# Patient Record
Sex: FEMALE | Race: WHITE | NOT HISPANIC OR LATINO | Employment: OTHER | URBAN - METROPOLITAN AREA
[De-identification: names, ages, dates, MRNs, and addresses within clinical notes are randomized per-mention and may not be internally consistent; named-entity substitution may affect disease eponyms.]

---

## 2018-07-06 ENCOUNTER — HISTORICAL (OUTPATIENT)
Dept: CARDIOLOGY | Facility: HOSPITAL | Age: 78
End: 2018-07-06

## 2018-07-16 ENCOUNTER — HISTORICAL (OUTPATIENT)
Dept: LAB | Facility: HOSPITAL | Age: 78
End: 2018-07-16

## 2018-07-16 LAB
ABS NEUT (OLG): 2.62 X10(3)/MCL (ref 2.1–9.2)
ALBUMIN SERPL-MCNC: 3.8 GM/DL (ref 3.4–5)
ALBUMIN/GLOB SERPL: 1.2 {RATIO}
ALP SERPL-CCNC: 61 UNIT/L (ref 38–126)
ALT SERPL-CCNC: 19 UNIT/L (ref 12–78)
AST SERPL-CCNC: 24 UNIT/L (ref 15–37)
BASOPHILS # BLD AUTO: 0 X10(3)/MCL (ref 0–0.2)
BASOPHILS NFR BLD AUTO: 0 %
BILIRUB SERPL-MCNC: 0.6 MG/DL (ref 0.2–1)
BILIRUBIN DIRECT+TOT PNL SERPL-MCNC: 0.1 MG/DL (ref 0–0.2)
BILIRUBIN DIRECT+TOT PNL SERPL-MCNC: 0.5 MG/DL (ref 0–0.8)
BUN SERPL-MCNC: 28 MG/DL (ref 7–18)
CALCIUM SERPL-MCNC: 9.1 MG/DL (ref 8.5–10.1)
CHLORIDE SERPL-SCNC: 105 MMOL/L (ref 98–107)
CHOLEST SERPL-MCNC: 256 MG/DL (ref 0–200)
CHOLEST/HDLC SERPL: 4.4 {RATIO} (ref 0–4)
CO2 SERPL-SCNC: 30 MMOL/L (ref 21–32)
CREAT SERPL-MCNC: 1.1 MG/DL (ref 0.55–1.02)
DEPRECATED CALCIDIOL+CALCIFEROL SERPL-MC: 36 NG/ML (ref 30–80)
EOSINOPHIL # BLD AUTO: 0.2 X10(3)/MCL (ref 0–0.9)
EOSINOPHIL NFR BLD AUTO: 4 %
ERYTHROCYTE [DISTWIDTH] IN BLOOD BY AUTOMATED COUNT: 14.4 % (ref 11.5–17)
GLOBULIN SER-MCNC: 3.3 GM/DL (ref 2.4–3.5)
GLUCOSE SERPL-MCNC: 99 MG/DL (ref 74–106)
HCT VFR BLD AUTO: 38.6 % (ref 37–47)
HDLC SERPL-MCNC: 58 MG/DL (ref 35–60)
HGB BLD-MCNC: 12 GM/DL (ref 12–16)
LDLC SERPL CALC-MCNC: 179 MG/DL (ref 0–129)
LYMPHOCYTES # BLD AUTO: 1.5 X10(3)/MCL (ref 0.6–4.6)
LYMPHOCYTES NFR BLD AUTO: 31 %
MAGNESIUM SERPL-MCNC: 2.2 MG/DL (ref 1.8–2.4)
MCH RBC QN AUTO: 27.3 PG (ref 27–31)
MCHC RBC AUTO-ENTMCNC: 31.1 GM/DL (ref 33–36)
MCV RBC AUTO: 87.9 FL (ref 80–94)
MONOCYTES # BLD AUTO: 0.5 X10(3)/MCL (ref 0.1–1.3)
MONOCYTES NFR BLD AUTO: 11 %
NEUTROPHILS # BLD AUTO: 2.62 X10(3)/MCL (ref 1.4–7.9)
NEUTROPHILS NFR BLD AUTO: 54 %
PHOSPHATE SERPL-MCNC: 4.1 MG/DL (ref 2.5–4.9)
PLATELET # BLD AUTO: 288 X10(3)/MCL (ref 130–400)
PMV BLD AUTO: 10.3 FL (ref 9.4–12.4)
POTASSIUM SERPL-SCNC: 4.6 MMOL/L (ref 3.5–5.1)
PROT SERPL-MCNC: 7.1 GM/DL (ref 6.4–8.2)
RBC # BLD AUTO: 4.39 X10(6)/MCL (ref 4.2–5.4)
SODIUM SERPL-SCNC: 141 MMOL/L (ref 136–145)
T4 SERPL-MCNC: 10.4 MCG/DL (ref 4.7–13.3)
TRIGL SERPL-MCNC: 96 MG/DL (ref 30–150)
TSH SERPL-ACNC: 1.64 MIU/L (ref 0.36–3.74)
VLDLC SERPL CALC-MCNC: 19 MG/DL
WBC # SPEC AUTO: 4.9 X10(3)/MCL (ref 4.5–11.5)

## 2018-09-04 ENCOUNTER — HISTORICAL (OUTPATIENT)
Dept: ADMINISTRATIVE | Facility: HOSPITAL | Age: 78
End: 2018-09-04

## 2018-10-16 ENCOUNTER — HISTORICAL (OUTPATIENT)
Dept: ADMINISTRATIVE | Facility: HOSPITAL | Age: 78
End: 2018-10-16

## 2019-01-23 ENCOUNTER — HISTORICAL (OUTPATIENT)
Dept: LAB | Facility: HOSPITAL | Age: 79
End: 2019-01-23

## 2019-01-23 LAB
ABS NEUT (OLG): 3.65 X10(3)/MCL (ref 2.1–9.2)
ALBUMIN SERPL-MCNC: 3.9 GM/DL (ref 3.4–5)
ALBUMIN/GLOB SERPL: 1.2 RATIO (ref 1.1–2)
ALP SERPL-CCNC: 60 UNIT/L (ref 38–126)
ALT SERPL-CCNC: 18 UNIT/L (ref 12–78)
AST SERPL-CCNC: 20 UNIT/L (ref 15–37)
BASOPHILS # BLD AUTO: 0 X10(3)/MCL (ref 0–0.2)
BASOPHILS NFR BLD AUTO: 0 %
BILIRUB SERPL-MCNC: 0.4 MG/DL (ref 0.2–1)
BILIRUBIN DIRECT+TOT PNL SERPL-MCNC: 0.1 MG/DL (ref 0–0.5)
BILIRUBIN DIRECT+TOT PNL SERPL-MCNC: 0.3 MG/DL (ref 0–0.8)
BUN SERPL-MCNC: 19 MG/DL (ref 7–18)
CALCIUM SERPL-MCNC: 9.4 MG/DL (ref 8.5–10.1)
CHLORIDE SERPL-SCNC: 105 MMOL/L (ref 98–107)
CO2 SERPL-SCNC: 32 MMOL/L (ref 21–32)
CREAT SERPL-MCNC: 1.02 MG/DL (ref 0.55–1.02)
DEPRECATED CALCIDIOL+CALCIFEROL SERPL-MC: 43.19 NG/ML (ref 30–80)
EOSINOPHIL # BLD AUTO: 0.1 X10(3)/MCL (ref 0–0.9)
EOSINOPHIL NFR BLD AUTO: 2 %
ERYTHROCYTE [DISTWIDTH] IN BLOOD BY AUTOMATED COUNT: 13.5 % (ref 11.5–17)
FERRITIN SERPL-MCNC: 7.9 NG/ML (ref 8–388)
FOLATE SERPL-MCNC: 19.7 NG/ML (ref 3.1–17.5)
GLOBULIN SER-MCNC: 3.2 GM/DL (ref 2.4–3.5)
GLUCOSE SERPL-MCNC: 97 MG/DL (ref 74–106)
HCT VFR BLD AUTO: 40.2 % (ref 37–47)
HGB BLD-MCNC: 12.2 GM/DL (ref 12–16)
IRON SATN MFR SERPL: 12.2 % (ref 20–50)
IRON SERPL-MCNC: 61 MCG/DL (ref 50–175)
LYMPHOCYTES # BLD AUTO: 1.3 X10(3)/MCL (ref 0.6–4.6)
LYMPHOCYTES NFR BLD AUTO: 24 %
MAGNESIUM SERPL-MCNC: 2.2 MG/DL (ref 1.8–2.4)
MCH RBC QN AUTO: 27.1 PG (ref 27–31)
MCHC RBC AUTO-ENTMCNC: 30.3 GM/DL (ref 33–36)
MCV RBC AUTO: 89.3 FL (ref 80–94)
MONOCYTES # BLD AUTO: 0.5 X10(3)/MCL (ref 0.1–1.3)
MONOCYTES NFR BLD AUTO: 10 %
NEUTROPHILS # BLD AUTO: 3.65 X10(3)/MCL (ref 2.1–9.2)
NEUTROPHILS NFR BLD AUTO: 64 %
PLATELET # BLD AUTO: 328 X10(3)/MCL (ref 130–400)
PMV BLD AUTO: 10 FL (ref 9.4–12.4)
POTASSIUM SERPL-SCNC: 4.6 MMOL/L (ref 3.5–5.1)
PROT SERPL-MCNC: 7.1 GM/DL (ref 6.4–8.2)
RBC # BLD AUTO: 4.5 X10(6)/MCL (ref 4.2–5.4)
SODIUM SERPL-SCNC: 141 MMOL/L (ref 136–145)
T4 FREE SERPL-MCNC: 0.94 NG/DL (ref 0.76–1.46)
TIBC SERPL-MCNC: 501 MCG/DL (ref 250–450)
TRANSFERRIN SERPL-MCNC: 370 MG/DL (ref 200–360)
TSH SERPL-ACNC: 2.62 MIU/L (ref 0.36–3.74)
VIT B12 SERPL-MCNC: 662 PG/ML (ref 193–986)
WBC # SPEC AUTO: 5.7 X10(3)/MCL (ref 4.5–11.5)

## 2020-02-10 ENCOUNTER — HISTORICAL (OUTPATIENT)
Dept: ADMINISTRATIVE | Facility: HOSPITAL | Age: 80
End: 2020-02-10

## 2020-02-10 LAB
ABS NEUT (OLG): 4.8 X10(3)/MCL (ref 2.1–9.2)
ALBUMIN SERPL-MCNC: 4 GM/DL (ref 3.4–5)
ALBUMIN/GLOB SERPL: 1.3 RATIO (ref 1.1–2)
ALP SERPL-CCNC: 55 UNIT/L (ref 38–126)
ALT SERPL-CCNC: 19 UNIT/L (ref 12–78)
AST SERPL-CCNC: 17 UNIT/L (ref 15–37)
BASOPHILS # BLD AUTO: 0 X10(3)/MCL (ref 0–0.2)
BASOPHILS NFR BLD AUTO: 0 %
BILIRUB SERPL-MCNC: 0.6 MG/DL (ref 0.2–1)
BILIRUBIN DIRECT+TOT PNL SERPL-MCNC: 0.2 MG/DL (ref 0–0.5)
BILIRUBIN DIRECT+TOT PNL SERPL-MCNC: 0.4 MG/DL (ref 0–0.8)
BUN SERPL-MCNC: 21 MG/DL (ref 7–18)
CALCIUM SERPL-MCNC: 9.4 MG/DL (ref 8.5–10.1)
CHLORIDE SERPL-SCNC: 105 MMOL/L (ref 98–107)
CO2 SERPL-SCNC: 30 MMOL/L (ref 21–32)
CREAT SERPL-MCNC: 1.09 MG/DL (ref 0.55–1.02)
EOSINOPHIL # BLD AUTO: 0.1 X10(3)/MCL (ref 0–0.9)
EOSINOPHIL NFR BLD AUTO: 2 %
ERYTHROCYTE [DISTWIDTH] IN BLOOD BY AUTOMATED COUNT: 12.2 % (ref 11.5–17)
GLOBULIN SER-MCNC: 3.1 GM/DL (ref 2.4–3.5)
GLUCOSE SERPL-MCNC: 102 MG/DL (ref 74–106)
HCT VFR BLD AUTO: 41.4 % (ref 37–47)
HGB BLD-MCNC: 13.2 GM/DL (ref 12–16)
LYMPHOCYTES # BLD AUTO: 1.4 X10(3)/MCL (ref 0.6–4.6)
LYMPHOCYTES NFR BLD AUTO: 20 %
MCH RBC QN AUTO: 29.5 PG (ref 27–31)
MCHC RBC AUTO-ENTMCNC: 31.9 GM/DL (ref 33–36)
MCV RBC AUTO: 92.4 FL (ref 80–94)
MONOCYTES # BLD AUTO: 0.5 X10(3)/MCL (ref 0.1–1.3)
MONOCYTES NFR BLD AUTO: 7 %
NEUTROPHILS # BLD AUTO: 4.8 X10(3)/MCL (ref 2.1–9.2)
NEUTROPHILS NFR BLD AUTO: 70 %
PLATELET # BLD AUTO: 281 X10(3)/MCL (ref 130–400)
PMV BLD AUTO: 9.4 FL (ref 9.4–12.4)
POTASSIUM SERPL-SCNC: 4.4 MMOL/L (ref 3.5–5.1)
PROT SERPL-MCNC: 7.1 GM/DL (ref 6.4–8.2)
RBC # BLD AUTO: 4.48 X10(6)/MCL (ref 4.2–5.4)
SODIUM SERPL-SCNC: 141 MMOL/L (ref 136–145)
WBC # SPEC AUTO: 6.9 X10(3)/MCL (ref 4.5–11.5)

## 2020-10-21 LAB
BUN SERPL-MCNC: 25 MG/DL (ref 7–18)
CHOLEST SERPL-MCNC: 205 MG/DL
CREAT SERPL-MCNC: 1.09 MG/DL (ref 0.6–1.3)
GLUCOSE SERPL-MCNC: 110 MG/DL (ref 74–106)
HDLC SERPL-MCNC: 59 MG/DL (ref 35–60)
LDLC SERPL CALC-MCNC: 128 MG/DL
TRIGL SERPL-MCNC: 100 MG/DL (ref 30–150)

## 2021-01-29 ENCOUNTER — TELEPHONE (OUTPATIENT)
Dept: UROLOGY | Facility: CLINIC | Age: 81
End: 2021-01-29

## 2021-03-04 ENCOUNTER — TELEPHONE (OUTPATIENT)
Dept: UROLOGY | Facility: CLINIC | Age: 81
End: 2021-03-04

## 2021-03-04 ENCOUNTER — PROCEDURE VISIT (OUTPATIENT)
Dept: UROLOGY | Facility: CLINIC | Age: 81
End: 2021-03-04
Payer: MEDICARE

## 2021-03-04 ENCOUNTER — OFFICE VISIT (OUTPATIENT)
Dept: UROLOGY | Facility: CLINIC | Age: 81
End: 2021-03-04
Payer: MEDICARE

## 2021-03-04 VITALS
TEMPERATURE: 97 F | RESPIRATION RATE: 18 BRPM | WEIGHT: 126.19 LBS | SYSTOLIC BLOOD PRESSURE: 140 MMHG | DIASTOLIC BLOOD PRESSURE: 76 MMHG | HEART RATE: 91 BPM | HEIGHT: 62 IN | BODY MASS INDEX: 23.22 KG/M2

## 2021-03-04 VITALS
SYSTOLIC BLOOD PRESSURE: 139 MMHG | HEART RATE: 80 BPM | HEIGHT: 62 IN | WEIGHT: 126.13 LBS | DIASTOLIC BLOOD PRESSURE: 89 MMHG | BODY MASS INDEX: 23.21 KG/M2

## 2021-03-04 DIAGNOSIS — R32 INCONTINENCE IN FEMALE: Primary | ICD-10-CM

## 2021-03-04 DIAGNOSIS — N39.41 URGENCY INCONTINENCE: ICD-10-CM

## 2021-03-04 PROCEDURE — 99205 PR OFFICE/OUTPT VISIT, NEW, LEVL V, 60-74 MIN: ICD-10-PCS | Mod: S$PBB,25,, | Performed by: UROLOGY

## 2021-03-04 PROCEDURE — 99999 PR PBB SHADOW E&M-EST. PATIENT-LVL III: ICD-10-PCS | Mod: PBBFAC,,, | Performed by: UROLOGY

## 2021-03-04 PROCEDURE — 52000 CYSTOURETHROSCOPY: CPT | Mod: S$PBB,,, | Performed by: UROLOGY

## 2021-03-04 PROCEDURE — 52000 PR CYSTOURETHROSCOPY: ICD-10-PCS | Mod: S$PBB,,, | Performed by: UROLOGY

## 2021-03-04 PROCEDURE — 52000 CYSTOURETHROSCOPY: CPT | Mod: PBBFAC | Performed by: UROLOGY

## 2021-03-04 PROCEDURE — 99205 OFFICE O/P NEW HI 60 MIN: CPT | Mod: S$PBB,25,, | Performed by: UROLOGY

## 2021-03-04 PROCEDURE — 99999 PR PBB SHADOW E&M-EST. PATIENT-LVL III: CPT | Mod: PBBFAC,,, | Performed by: UROLOGY

## 2021-03-04 PROCEDURE — 99213 OFFICE O/P EST LOW 20 MIN: CPT | Mod: PBBFAC,25 | Performed by: UROLOGY

## 2021-03-04 RX ORDER — LEVOTHYROXINE SODIUM 75 UG/1
TABLET ORAL
COMMUNITY
Start: 2020-10-28 | End: 2022-08-17 | Stop reason: SDUPTHER

## 2021-03-04 RX ORDER — OMEPRAZOLE 20 MG/1
20 CAPSULE, DELAYED RELEASE ORAL
COMMUNITY
Start: 2020-10-28 | End: 2022-08-17 | Stop reason: SDUPTHER

## 2021-03-04 RX ORDER — LEVOTHYROXINE SODIUM 50 UG/1
TABLET ORAL
COMMUNITY
Start: 2020-10-28 | End: 2022-09-12 | Stop reason: SDUPTHER

## 2021-03-04 RX ORDER — LIDOCAINE HYDROCHLORIDE 20 MG/ML
JELLY TOPICAL
Status: COMPLETED | OUTPATIENT
Start: 2021-03-04 | End: 2021-03-04

## 2021-03-04 RX ORDER — PRAVASTATIN SODIUM 20 MG/1
20 TABLET ORAL
COMMUNITY
Start: 2020-10-28 | End: 2022-08-17 | Stop reason: SDUPTHER

## 2021-03-04 RX ORDER — ASPIRIN 81 MG/1
81 TABLET ORAL DAILY
COMMUNITY

## 2021-03-04 RX ORDER — CHOLECALCIFEROL (VITAMIN D3) 25 MCG
1000 TABLET ORAL DAILY
COMMUNITY

## 2021-03-04 RX ADMIN — LIDOCAINE HYDROCHLORIDE: 20 JELLY TOPICAL at 12:03

## 2021-03-22 ENCOUNTER — TELEPHONE (OUTPATIENT)
Dept: UROLOGY | Facility: CLINIC | Age: 81
End: 2021-03-22

## 2021-04-01 ENCOUNTER — TELEPHONE (OUTPATIENT)
Dept: UROLOGY | Facility: CLINIC | Age: 81
End: 2021-04-01

## 2021-04-01 DIAGNOSIS — N32.0 BLADDER OUTLET OBSTRUCTION: Primary | ICD-10-CM

## 2021-04-22 ENCOUNTER — HISTORICAL (OUTPATIENT)
Dept: ADMINISTRATIVE | Facility: HOSPITAL | Age: 81
End: 2021-04-22

## 2021-04-22 LAB
ABS NEUT (OLG): 3.43 X10(3)/MCL (ref 2.1–9.2)
ALBUMIN SERPL-MCNC: 4.2 GM/DL (ref 3.4–4.8)
ALBUMIN/GLOB SERPL: 1.4 RATIO (ref 1.1–2)
ALP SERPL-CCNC: 61 UNIT/L (ref 40–150)
ALT SERPL-CCNC: 18 UNIT/L (ref 0–55)
AST SERPL-CCNC: 27 UNIT/L (ref 5–34)
BASOPHILS # BLD AUTO: 0 X10(3)/MCL (ref 0–0.2)
BASOPHILS NFR BLD AUTO: 1 %
BILIRUB SERPL-MCNC: 0.7 MG/DL
BILIRUBIN DIRECT+TOT PNL SERPL-MCNC: 0.2 MG/DL (ref 0–0.5)
BILIRUBIN DIRECT+TOT PNL SERPL-MCNC: 0.5 MG/DL (ref 0–0.8)
BUN SERPL-MCNC: 20.6 MG/DL (ref 9.8–20.1)
CALCIUM SERPL-MCNC: 9.6 MG/DL (ref 8.4–10.2)
CHLORIDE SERPL-SCNC: 104 MMOL/L (ref 98–107)
CHOLEST SERPL-MCNC: 213 MG/DL
CHOLEST/HDLC SERPL: 4 {RATIO} (ref 0–5)
CO2 SERPL-SCNC: 29 MMOL/L (ref 23–31)
CREAT SERPL-MCNC: 0.97 MG/DL (ref 0.55–1.02)
EOSINOPHIL # BLD AUTO: 0.1 X10(3)/MCL (ref 0–0.9)
EOSINOPHIL NFR BLD AUTO: 3 %
ERYTHROCYTE [DISTWIDTH] IN BLOOD BY AUTOMATED COUNT: 12.5 % (ref 11.5–17)
GLOBULIN SER-MCNC: 2.9 GM/DL (ref 2.4–3.5)
GLUCOSE SERPL-MCNC: 79 MG/DL (ref 82–115)
HCT VFR BLD AUTO: 41.8 % (ref 37–47)
HDLC SERPL-MCNC: 59 MG/DL (ref 35–60)
HGB BLD-MCNC: 13.4 GM/DL (ref 12–16)
LDLC SERPL CALC-MCNC: 136 MG/DL (ref 50–140)
LYMPHOCYTES # BLD AUTO: 1.3 X10(3)/MCL (ref 0.6–4.6)
LYMPHOCYTES NFR BLD AUTO: 24 %
MCH RBC QN AUTO: 29.8 PG (ref 27–31)
MCHC RBC AUTO-ENTMCNC: 32.1 GM/DL (ref 33–36)
MCV RBC AUTO: 92.9 FL (ref 80–94)
MONOCYTES # BLD AUTO: 0.5 X10(3)/MCL (ref 0.1–1.3)
MONOCYTES NFR BLD AUTO: 9 %
NEUTROPHILS # BLD AUTO: 3.43 X10(3)/MCL (ref 2.1–9.2)
NEUTROPHILS NFR BLD AUTO: 63 %
PLATELET # BLD AUTO: 302 X10(3)/MCL (ref 130–400)
PMV BLD AUTO: 10.5 FL (ref 9.4–12.4)
POTASSIUM SERPL-SCNC: 4.6 MMOL/L (ref 3.5–5.1)
PROT SERPL-MCNC: 7.1 GM/DL (ref 5.8–7.6)
RBC # BLD AUTO: 4.5 X10(6)/MCL (ref 4.2–5.4)
SODIUM SERPL-SCNC: 140 MMOL/L (ref 136–145)
T3FREE SERPL-MCNC: 2.43 PG/ML (ref 1.58–3.91)
T4 FREE SERPL-MCNC: 1.02 NG/DL (ref 0.7–1.48)
TRIGL SERPL-MCNC: 88 MG/DL (ref 37–140)
TSH SERPL-ACNC: 3.97 UIU/ML (ref 0.35–4.94)
VLDLC SERPL CALC-MCNC: 18 MG/DL
WBC # SPEC AUTO: 5.4 X10(3)/MCL (ref 4.5–11.5)

## 2021-05-03 ENCOUNTER — TELEPHONE (OUTPATIENT)
Dept: UROLOGY | Facility: CLINIC | Age: 81
End: 2021-05-03

## 2021-05-03 RX ORDER — CEFAZOLIN SODIUM 1 G/3ML
2 INJECTION, POWDER, FOR SOLUTION INTRAMUSCULAR; INTRAVENOUS
Status: CANCELLED | OUTPATIENT
Start: 2021-05-03

## 2021-05-04 ENCOUNTER — ANESTHESIA EVENT (OUTPATIENT)
Dept: SURGERY | Facility: HOSPITAL | Age: 81
End: 2021-05-04
Payer: MEDICARE

## 2021-05-04 ENCOUNTER — ANESTHESIA (OUTPATIENT)
Dept: SURGERY | Facility: HOSPITAL | Age: 81
End: 2021-05-04
Payer: MEDICARE

## 2021-05-04 ENCOUNTER — HOSPITAL ENCOUNTER (OUTPATIENT)
Facility: HOSPITAL | Age: 81
Discharge: HOME | End: 2021-05-04
Attending: UROLOGY | Admitting: UROLOGY
Payer: MEDICARE

## 2021-05-04 VITALS
DIASTOLIC BLOOD PRESSURE: 81 MMHG | HEART RATE: 75 BPM | BODY MASS INDEX: 23.79 KG/M2 | WEIGHT: 126 LBS | OXYGEN SATURATION: 98 % | RESPIRATION RATE: 18 BRPM | TEMPERATURE: 98 F | HEIGHT: 61 IN | SYSTOLIC BLOOD PRESSURE: 148 MMHG

## 2021-05-04 DIAGNOSIS — N39.41 URGENCY INCONTINENCE: ICD-10-CM

## 2021-05-04 PROCEDURE — 36000706: Performed by: UROLOGY

## 2021-05-04 PROCEDURE — D9220A PRA ANESTHESIA: Mod: ANES,,, | Performed by: ANESTHESIOLOGY

## 2021-05-04 PROCEDURE — 36000707: Performed by: UROLOGY

## 2021-05-04 PROCEDURE — 37000009 HC ANESTHESIA EA ADD 15 MINS: Performed by: UROLOGY

## 2021-05-04 PROCEDURE — 57000 COLPOTOMY W/EXPLORATION: CPT | Mod: ,,, | Performed by: UROLOGY

## 2021-05-04 PROCEDURE — 57000: ICD-10-PCS | Mod: ,,, | Performed by: UROLOGY

## 2021-05-04 PROCEDURE — 25000003 PHARM REV CODE 250: Performed by: NURSE ANESTHETIST, CERTIFIED REGISTERED

## 2021-05-04 PROCEDURE — 37000008 HC ANESTHESIA 1ST 15 MINUTES: Performed by: UROLOGY

## 2021-05-04 PROCEDURE — 71000044 HC DOSC ROUTINE RECOVERY FIRST HOUR: Performed by: UROLOGY

## 2021-05-04 PROCEDURE — 71000015 HC POSTOP RECOV 1ST HR: Performed by: UROLOGY

## 2021-05-04 PROCEDURE — 25000003 PHARM REV CODE 250: Performed by: UROLOGY

## 2021-05-04 PROCEDURE — 27201423 OPTIME MED/SURG SUP & DEVICES STERILE SUPPLY: Performed by: UROLOGY

## 2021-05-04 PROCEDURE — D9220A PRA ANESTHESIA: Mod: CRNA,,, | Performed by: NURSE ANESTHETIST, CERTIFIED REGISTERED

## 2021-05-04 PROCEDURE — 63600175 PHARM REV CODE 636 W HCPCS: Performed by: NURSE ANESTHETIST, CERTIFIED REGISTERED

## 2021-05-04 PROCEDURE — D9220A PRA ANESTHESIA: ICD-10-PCS | Mod: CRNA,,, | Performed by: NURSE ANESTHETIST, CERTIFIED REGISTERED

## 2021-05-04 PROCEDURE — D9220A PRA ANESTHESIA: ICD-10-PCS | Mod: ANES,,, | Performed by: ANESTHESIOLOGY

## 2021-05-04 RX ORDER — ONDANSETRON 2 MG/ML
INJECTION INTRAMUSCULAR; INTRAVENOUS
Status: DISCONTINUED | OUTPATIENT
Start: 2021-05-04 | End: 2021-05-04

## 2021-05-04 RX ORDER — SODIUM CHLORIDE 0.9 % (FLUSH) 0.9 %
3 SYRINGE (ML) INJECTION
Status: DISCONTINUED | OUTPATIENT
Start: 2021-05-04 | End: 2021-05-04 | Stop reason: HOSPADM

## 2021-05-04 RX ORDER — PROPOFOL 10 MG/ML
VIAL (ML) INTRAVENOUS
Status: DISCONTINUED | OUTPATIENT
Start: 2021-05-04 | End: 2021-05-04

## 2021-05-04 RX ORDER — FENTANYL CITRATE 50 UG/ML
INJECTION, SOLUTION INTRAMUSCULAR; INTRAVENOUS
Status: DISCONTINUED | OUTPATIENT
Start: 2021-05-04 | End: 2021-05-04

## 2021-05-04 RX ORDER — EPHEDRINE SULFATE 50 MG/ML
INJECTION, SOLUTION INTRAVENOUS
Status: DISCONTINUED | OUTPATIENT
Start: 2021-05-04 | End: 2021-05-04

## 2021-05-04 RX ORDER — DEXAMETHASONE SODIUM PHOSPHATE 4 MG/ML
INJECTION, SOLUTION INTRA-ARTICULAR; INTRALESIONAL; INTRAMUSCULAR; INTRAVENOUS; SOFT TISSUE
Status: DISCONTINUED | OUTPATIENT
Start: 2021-05-04 | End: 2021-05-04

## 2021-05-04 RX ORDER — OXYCODONE AND ACETAMINOPHEN 5; 325 MG/1; MG/1
1 TABLET ORAL EVERY 4 HOURS PRN
Qty: 5 TABLET | Refills: 0 | Status: SHIPPED | OUTPATIENT
Start: 2021-05-04 | End: 2021-05-31

## 2021-05-04 RX ORDER — LIDOCAINE HYDROCHLORIDE 20 MG/ML
INJECTION INTRAVENOUS
Status: DISCONTINUED | OUTPATIENT
Start: 2021-05-04 | End: 2021-05-04

## 2021-05-04 RX ORDER — HYDROCODONE BITARTRATE AND ACETAMINOPHEN 5; 325 MG/1; MG/1
1 TABLET ORAL EVERY 4 HOURS PRN
Status: DISCONTINUED | OUTPATIENT
Start: 2021-05-04 | End: 2021-05-04 | Stop reason: HOSPADM

## 2021-05-04 RX ORDER — METOPROLOL TARTRATE 1 MG/ML
INJECTION, SOLUTION INTRAVENOUS
Status: DISCONTINUED | OUTPATIENT
Start: 2021-05-04 | End: 2021-05-04

## 2021-05-04 RX ORDER — CEFAZOLIN SODIUM 1 G/3ML
INJECTION, POWDER, FOR SOLUTION INTRAMUSCULAR; INTRAVENOUS
Status: DISCONTINUED | OUTPATIENT
Start: 2021-05-04 | End: 2021-05-04

## 2021-05-04 RX ORDER — BUPIVACAINE HYDROCHLORIDE AND EPINEPHRINE 5; 5 MG/ML; UG/ML
INJECTION, SOLUTION EPIDURAL; INTRACAUDAL; PERINEURAL
Status: DISCONTINUED | OUTPATIENT
Start: 2021-05-04 | End: 2021-05-04 | Stop reason: HOSPADM

## 2021-05-04 RX ORDER — PHENYLEPHRINE HCL IN 0.9% NACL 1 MG/10 ML
SYRINGE (ML) INTRAVENOUS
Status: DISCONTINUED | OUTPATIENT
Start: 2021-05-04 | End: 2021-05-04

## 2021-05-04 RX ADMIN — GLYCOPYRROLATE 0.2 MCG: 0.2 INJECTION, SOLUTION INTRAMUSCULAR; INTRAVITREAL at 12:05

## 2021-05-04 RX ADMIN — CEFAZOLIN 2 G: 330 INJECTION, POWDER, FOR SOLUTION INTRAMUSCULAR; INTRAVENOUS at 11:05

## 2021-05-04 RX ADMIN — ONDANSETRON 4 MG: 2 INJECTION, SOLUTION INTRAMUSCULAR; INTRAVENOUS at 01:05

## 2021-05-04 RX ADMIN — LIDOCAINE HYDROCHLORIDE 50 MG: 20 INJECTION, SOLUTION INTRAVENOUS at 11:05

## 2021-05-04 RX ADMIN — Medication 100 MCG: at 12:05

## 2021-05-04 RX ADMIN — FENTANYL CITRATE 50 MCG: 50 INJECTION, SOLUTION INTRAMUSCULAR; INTRAVENOUS at 01:05

## 2021-05-04 RX ADMIN — Medication 200 MCG: at 12:05

## 2021-05-04 RX ADMIN — PROPOFOL 100 MG: 10 INJECTION, EMULSION INTRAVENOUS at 11:05

## 2021-05-04 RX ADMIN — EPHEDRINE SULFATE 10 MG: 50 INJECTION INTRAVENOUS at 12:05

## 2021-05-04 RX ADMIN — FENTANYL CITRATE 25 MCG: 50 INJECTION, SOLUTION INTRAMUSCULAR; INTRAVENOUS at 11:05

## 2021-05-04 RX ADMIN — DEXAMETHASONE SODIUM PHOSPHATE 4 MG: 4 INJECTION, SOLUTION INTRAMUSCULAR; INTRAVENOUS at 01:05

## 2021-05-04 RX ADMIN — FENTANYL CITRATE 25 MCG: 50 INJECTION, SOLUTION INTRAMUSCULAR; INTRAVENOUS at 12:05

## 2021-05-04 RX ADMIN — SODIUM CHLORIDE: 0.9 INJECTION, SOLUTION INTRAVENOUS at 11:05

## 2021-05-04 RX ADMIN — METOPROLOL TARTRATE 2.5 MG: 5 INJECTION, SOLUTION INTRAVENOUS at 01:05

## 2021-05-04 RX ADMIN — LIDOCAINE HYDROCHLORIDE 50 MG: 20 INJECTION, SOLUTION INTRAVENOUS at 01:05

## 2021-05-25 ENCOUNTER — TELEPHONE (OUTPATIENT)
Dept: UROLOGY | Facility: CLINIC | Age: 81
End: 2021-05-25

## 2021-05-31 ENCOUNTER — OFFICE VISIT (OUTPATIENT)
Dept: UROLOGY | Facility: CLINIC | Age: 81
End: 2021-05-31
Payer: MEDICARE

## 2021-05-31 VITALS
BODY MASS INDEX: 23.85 KG/M2 | SYSTOLIC BLOOD PRESSURE: 147 MMHG | DIASTOLIC BLOOD PRESSURE: 90 MMHG | WEIGHT: 126.31 LBS | HEART RATE: 80 BPM | HEIGHT: 61 IN

## 2021-05-31 DIAGNOSIS — N39.46 MIXED INCONTINENCE: Primary | ICD-10-CM

## 2021-05-31 PROCEDURE — 99999 PR PBB SHADOW E&M-EST. PATIENT-LVL III: CPT | Mod: PBBFAC,,, | Performed by: UROLOGY

## 2021-05-31 PROCEDURE — 99999 PR PBB SHADOW E&M-EST. PATIENT-LVL III: ICD-10-PCS | Mod: PBBFAC,,, | Performed by: UROLOGY

## 2021-05-31 PROCEDURE — 99214 PR OFFICE/OUTPT VISIT, EST, LEVL IV, 30-39 MIN: ICD-10-PCS | Mod: S$PBB,,, | Performed by: UROLOGY

## 2021-05-31 PROCEDURE — 99214 OFFICE O/P EST MOD 30 MIN: CPT | Mod: S$PBB,,, | Performed by: UROLOGY

## 2021-05-31 PROCEDURE — 99213 OFFICE O/P EST LOW 20 MIN: CPT | Mod: PBBFAC | Performed by: UROLOGY

## 2021-05-31 RX ORDER — SOLIFENACIN SUCCINATE 10 MG/1
10 TABLET, FILM COATED ORAL DAILY
Qty: 30 TABLET | Refills: 11 | Status: SHIPPED | OUTPATIENT
Start: 2021-05-31 | End: 2022-05-31

## 2021-05-31 RX ORDER — ESCITALOPRAM OXALATE 10 MG/1
5 TABLET ORAL DAILY
COMMUNITY
Start: 2021-05-17 | End: 2022-08-17 | Stop reason: SDUPTHER

## 2021-06-30 ENCOUNTER — HISTORICAL (OUTPATIENT)
Dept: ADMINISTRATIVE | Facility: HOSPITAL | Age: 81
End: 2021-06-30

## 2021-06-30 LAB
ABS NEUT (OLG): 4.55 X10(3)/MCL (ref 2.1–9.2)
ALBUMIN SERPL-MCNC: 3.9 GM/DL (ref 3.4–4.8)
ALBUMIN/GLOB SERPL: 1.3 RATIO (ref 1.1–2)
ALP SERPL-CCNC: 59 UNIT/L (ref 40–150)
ALT SERPL-CCNC: 11 UNIT/L (ref 0–55)
APPEARANCE, UA: CLEAR
AST SERPL-CCNC: 19 UNIT/L (ref 5–34)
BACTERIA SPEC CULT: NORMAL /HPF
BASOPHILS # BLD AUTO: 0 X10(3)/MCL (ref 0–0.2)
BASOPHILS NFR BLD AUTO: 0 %
BILIRUB SERPL-MCNC: 0.7 MG/DL
BILIRUB UR QL STRIP: NEGATIVE
BILIRUBIN DIRECT+TOT PNL SERPL-MCNC: 0.2 MG/DL (ref 0–0.5)
BILIRUBIN DIRECT+TOT PNL SERPL-MCNC: 0.5 MG/DL (ref 0–0.8)
BUN SERPL-MCNC: 17 MG/DL (ref 9.8–20.1)
CALCIUM SERPL-MCNC: 9.6 MG/DL (ref 8.4–10.2)
CHLORIDE SERPL-SCNC: 100 MMOL/L (ref 98–107)
CO2 SERPL-SCNC: 29 MMOL/L (ref 23–31)
COLOR UR: YELLOW
CREAT SERPL-MCNC: 1.18 MG/DL (ref 0.55–1.02)
DEPRECATED CALCIDIOL+CALCIFEROL SERPL-MC: 46.3 NG/ML (ref 30–80)
EOSINOPHIL # BLD AUTO: 0.1 X10(3)/MCL (ref 0–0.9)
EOSINOPHIL NFR BLD AUTO: 1 %
ERYTHROCYTE [DISTWIDTH] IN BLOOD BY AUTOMATED COUNT: 12.3 % (ref 11.5–17)
FERRITIN SERPL-MCNC: 33.92 NG/ML (ref 4.63–204)
GLOBULIN SER-MCNC: 3.1 GM/DL (ref 2.4–3.5)
GLUCOSE (UA): NEGATIVE
GLUCOSE SERPL-MCNC: 106 MG/DL (ref 82–115)
HCT VFR BLD AUTO: 40.4 % (ref 37–47)
HGB BLD-MCNC: 12.8 GM/DL (ref 12–16)
HGB UR QL STRIP: NEGATIVE
IRON SATN MFR SERPL: 30 % (ref 20–50)
IRON SERPL-MCNC: 95 UG/DL (ref 50–170)
KETONES UR QL STRIP: NEGATIVE
LEUKOCYTE ESTERASE UR QL STRIP: NEGATIVE
LYMPHOCYTES # BLD AUTO: 1.2 X10(3)/MCL (ref 0.6–4.6)
LYMPHOCYTES NFR BLD AUTO: 19 %
MCH RBC QN AUTO: 29.2 PG (ref 27–31)
MCHC RBC AUTO-ENTMCNC: 31.7 GM/DL (ref 33–36)
MCV RBC AUTO: 92 FL (ref 80–94)
MONOCYTES # BLD AUTO: 0.5 X10(3)/MCL (ref 0.1–1.3)
MONOCYTES NFR BLD AUTO: 8 %
NEUTROPHILS # BLD AUTO: 4.55 X10(3)/MCL (ref 2.1–9.2)
NEUTROPHILS NFR BLD AUTO: 72 %
NITRITE UR QL STRIP: NEGATIVE
PH UR STRIP: 6.5 [PH] (ref 5–9)
PLATELET # BLD AUTO: 268 X10(3)/MCL (ref 130–400)
PMV BLD AUTO: 10.1 FL (ref 9.4–12.4)
POTASSIUM SERPL-SCNC: 4.4 MMOL/L (ref 3.5–5.1)
PROT SERPL-MCNC: 7 GM/DL (ref 5.8–7.6)
PROT UR QL STRIP: NEGATIVE
RBC # BLD AUTO: 4.39 X10(6)/MCL (ref 4.2–5.4)
RBC #/AREA URNS HPF: NORMAL /[HPF]
SODIUM SERPL-SCNC: 135 MMOL/L (ref 136–145)
SP GR UR STRIP: 1.01 (ref 1–1.03)
SQUAMOUS EPITHELIAL, UA: NORMAL /HPF (ref 0–4)
T3FREE SERPL-MCNC: 1.88 PG/ML (ref 1.58–3.91)
T4 FREE SERPL-MCNC: 1 NG/DL (ref 0.7–1.48)
TIBC SERPL-MCNC: 227 UG/DL (ref 70–310)
TIBC SERPL-MCNC: 322 UG/DL (ref 250–450)
TRANSFERRIN SERPL-MCNC: 290 MG/DL
TSH SERPL-ACNC: 4.76 UIU/ML (ref 0.35–4.94)
UROBILINOGEN UR STRIP-ACNC: 0.2
VIT B12 SERPL-MCNC: 599 PG/ML (ref 213–816)
WBC # SPEC AUTO: 6.4 X10(3)/MCL (ref 4.5–11.5)
WBC #/AREA URNS HPF: NORMAL /[HPF]

## 2021-10-25 ENCOUNTER — HISTORICAL (OUTPATIENT)
Dept: ADMINISTRATIVE | Facility: HOSPITAL | Age: 81
End: 2021-10-25

## 2021-10-25 LAB
APPEARANCE, UA: ABNORMAL
BACTERIA SPEC CULT: ABNORMAL /HPF
BILIRUB UR QL STRIP: NEGATIVE
BUN SERPL-MCNC: 25.3 MG/DL (ref 9.8–20.1)
CALCIUM SERPL-MCNC: 9.9 MG/DL (ref 8.7–10.5)
CHLORIDE SERPL-SCNC: 105 MMOL/L (ref 98–107)
CHOLEST SERPL-MCNC: 214 MG/DL
CHOLEST/HDLC SERPL: 4 {RATIO} (ref 0–5)
CO2 SERPL-SCNC: 31 MMOL/L (ref 23–31)
COLOR UR: YELLOW
CREAT SERPL-MCNC: 1.12 MG/DL (ref 0.55–1.02)
CREAT/UREA NIT SERPL: 23
GLUCOSE (UA): NEGATIVE
GLUCOSE SERPL-MCNC: 103 MG/DL (ref 82–115)
HDLC SERPL-MCNC: 56 MG/DL (ref 35–60)
HGB UR QL STRIP: NEGATIVE
KETONES UR QL STRIP: NEGATIVE
LDLC SERPL CALC-MCNC: 140 MG/DL (ref 50–140)
LEUKOCYTE ESTERASE UR QL STRIP: ABNORMAL
NITRITE UR QL STRIP: POSITIVE
PH UR STRIP: 7.5 [PH] (ref 5–9)
POTASSIUM SERPL-SCNC: 4.6 MMOL/L (ref 3.5–5.1)
PROT UR QL STRIP: NEGATIVE
RBC #/AREA URNS HPF: ABNORMAL /[HPF]
SODIUM SERPL-SCNC: 143 MMOL/L (ref 136–145)
SP GR UR STRIP: 1.02 (ref 1–1.03)
SQUAMOUS EPITHELIAL, UA: ABNORMAL /HPF
T3FREE SERPL-MCNC: 2.11 PG/ML (ref 1.58–3.91)
T4 FREE SERPL-MCNC: 0.8 NG/DL (ref 0.7–1.48)
TRIGL SERPL-MCNC: 91 MG/DL (ref 37–140)
TSH SERPL-ACNC: 2.8 UIU/ML (ref 0.35–4.94)
UA WBC MAN: ABNORMAL /HPF
UROBILINOGEN UR STRIP-ACNC: 1
VLDLC SERPL CALC-MCNC: 18 MG/DL
WBC #/AREA URNS HPF: 169 /HPF (ref 0–3)

## 2022-03-13 ENCOUNTER — HISTORICAL (OUTPATIENT)
Dept: ADMINISTRATIVE | Facility: HOSPITAL | Age: 82
End: 2022-03-13

## 2022-04-10 ENCOUNTER — HISTORICAL (OUTPATIENT)
Dept: ADMINISTRATIVE | Facility: HOSPITAL | Age: 82
End: 2022-04-10
Payer: MEDICARE

## 2022-04-15 ENCOUNTER — HISTORICAL (OUTPATIENT)
Dept: ADMINISTRATIVE | Facility: HOSPITAL | Age: 82
End: 2022-04-15

## 2022-04-15 LAB
ABS NEUT (OLG): 3.16 (ref 2.1–9.2)
ALBUMIN SERPL-MCNC: 4.3 G/DL (ref 3.4–4.8)
ALBUMIN/GLOB SERPL: 1.5 {RATIO} (ref 1.1–2)
ALP SERPL-CCNC: 59 U/L (ref 40–150)
ALT SERPL-CCNC: 18 U/L (ref 0–55)
APPEARANCE, UA: CLEAR
AST SERPL-CCNC: 27 U/L (ref 5–34)
BACTERIA SPEC CULT: NORMAL
BASOPHILS # BLD AUTO: 0 10*3/UL (ref 0–0.2)
BASOPHILS NFR BLD AUTO: 0 %
BILIRUB SERPL-MCNC: 0.7 MG/DL
BILIRUB UR QL STRIP: NEGATIVE
BILIRUBIN DIRECT+TOT PNL SERPL-MCNC: 0.2 (ref 0–0.5)
BILIRUBIN DIRECT+TOT PNL SERPL-MCNC: 0.5 (ref 0–0.8)
BUDDING YEAST: NORMAL
BUN SERPL-MCNC: 21.9 MG/DL (ref 9.8–20.1)
CALCIUM SERPL-MCNC: 10 MG/DL (ref 8.7–10.5)
CASTS, UA: NORMAL
CHLORIDE SERPL-SCNC: 103 MMOL/L (ref 98–107)
CHOLEST SERPL-MCNC: 230 MG/DL
CHOLEST/HDLC SERPL: 4 {RATIO} (ref 0–5)
CO2 SERPL-SCNC: 29 MMOL/L (ref 23–31)
COLOR UR: YELLOW
CREAT SERPL-MCNC: 1.19 MG/DL (ref 0.55–1.02)
CRYSTALS: NORMAL
DEPRECATED CALCIDIOL+CALCIFEROL SERPL-MC: 33.9 NG/ML (ref 30–80)
EOSINOPHIL # BLD AUTO: 0.2 10*3/UL (ref 0–0.9)
EOSINOPHIL NFR BLD AUTO: 4 %
ERYTHROCYTE [DISTWIDTH] IN BLOOD BY AUTOMATED COUNT: 12.5 % (ref 11.5–17)
GLOBULIN SER-MCNC: 2.8 G/DL (ref 2.4–3.5)
GLUCOSE (UA): NEGATIVE
GLUCOSE SERPL-MCNC: 99 MG/DL (ref 82–115)
HCT VFR BLD AUTO: 40.8 % (ref 37–47)
HDLC SERPL-MCNC: 55 MG/DL (ref 35–60)
HEMOLYSIS INTERF INDEX SERPL-ACNC: 1
HGB BLD-MCNC: 12.9 G/DL (ref 12–16)
HGB UR QL STRIP: NEGATIVE
ICTERIC INTERF INDEX SERPL-ACNC: 1
KETONES UR QL STRIP: NEGATIVE
LDLC SERPL CALC-MCNC: 145 MG/DL (ref 50–140)
LEUKOCYTE ESTERASE UR QL STRIP: NORMAL
LIPEMIC INTERF INDEX SERPL-ACNC: 0
LYMPHOCYTES # BLD AUTO: 1.7 10*3/UL (ref 0.6–4.6)
LYMPHOCYTES NFR BLD AUTO: 30 %
MANUAL DIFF? (OHS): NO
MCH RBC QN AUTO: 29.3 PG (ref 27–31)
MCHC RBC AUTO-ENTMCNC: 31.6 G/DL (ref 33–36)
MCV RBC AUTO: 92.7 FL (ref 80–94)
MONOCYTES # BLD AUTO: 0.6 10*3/UL (ref 0.1–1.3)
MONOCYTES NFR BLD AUTO: 10 %
NEUTROPHILS # BLD AUTO: 3.16 10*3/UL (ref 2.1–9.2)
NEUTROPHILS NFR BLD AUTO: 55 %
NITRITE UR QL STRIP: NEGATIVE
PH UR STRIP: 7 [PH] (ref 5–9)
PLATELET # BLD AUTO: 271 10*3/UL (ref 130–400)
PMV BLD AUTO: 10.6 FL (ref 9.4–12.4)
POTASSIUM SERPL-SCNC: 4.5 MMOL/L (ref 3.5–5.1)
PROT SERPL-MCNC: 7.1 G/DL (ref 5.8–7.6)
PROT UR QL STRIP: NEGATIVE
RBC # BLD AUTO: 4.4 10*6/UL (ref 4.2–5.4)
RBC #/AREA URNS HPF: NORMAL /[HPF] (ref 0–2)
SMALL ROUND CELLS, UA: NORMAL
SODIUM SERPL-SCNC: 142 MMOL/L (ref 136–145)
SP GR UR STRIP: 1.01 (ref 1–1.03)
SPERM URNS QL MICRO: NORMAL
SQUAMOUS EPITHELIAL, UA: NORMAL (ref 0–4)
T3FREE SERPL-MCNC: 2.44 PG/ML (ref 1.58–3.91)
T4 FREE SERPL-MCNC: 0.86 NG/DL (ref 0.7–1.48)
TRIGL SERPL-MCNC: 152 MG/DL (ref 37–140)
TSH SERPL-ACNC: 7.53 M[IU]/L (ref 0.35–4.94)
UROBILINOGEN UR STRIP-ACNC: 0.2
VIT B12 SERPL-MCNC: 839 PG/ML (ref 213–816)
VLDLC SERPL CALC-MCNC: 30 MG/DL
WBC # SPEC AUTO: 5.7 10*3/UL (ref 4.5–11.5)
WBC #/AREA URNS HPF: 6 /[HPF] (ref 0–3)

## 2022-04-28 VITALS
WEIGHT: 123 LBS | BODY MASS INDEX: 23.22 KG/M2 | SYSTOLIC BLOOD PRESSURE: 140 MMHG | OXYGEN SATURATION: 96 % | DIASTOLIC BLOOD PRESSURE: 72 MMHG | HEIGHT: 61 IN

## 2022-04-30 NOTE — OP NOTE
Patient:   Trever Del Toro            MRN: [id number]            FIN: [id number]               Age:   78 years     Sex:  Female     :  1940   Associated Diagnoses:   None   Author:   Farhat Childress MD      Phacoemulsification of Cataract with Intraocular Implant    Preoperative Diagnosis: Cataract Right Eye     Postoperative Diagnosis: Cataract Right Eye     Surgeon: Farhat Childress MD    Assistant: SEAN Najera    Anestheisa: Topical    Complications: None    After the patient underwent topical anesthesia along with IV sedation in the holding area, the patient was brought to the [facility] laser.  The patient was marked at 0 & 180 degrees.  A time out was performed.  The capsulotomy, lens fragmentation and LRI's at 62 & 242 degrees with a length of 23 degrees were completed.  Then the patient was brought to the operating suite.  The patient was prepped and draped in a sterile fashion.  A pediatric tegaderm and lid speculum were used to retract the upper and lower lashes and lids.  A 1.0mm paracentesis was then made at the 12 o'clock position.  Intraocular non-preserved 1% Xylocaine (4% diluted down to 1%) was irrigated into the anterior chamber.  Endocoat was injected into the eye.  A clear corneal incision was made with a 2.4mm Keratome blade.  BSS was used to hydro dissect the nucleus from the capsule.  The nucleus was then phacoemulsified with the Abbott machine for a EFX of 18.  The cortex was then removed with the I/A hand piece and Helon was placed into the posterior bag of the eye.  A posterior chamber implant [id number] of power 23.0 was placed in the capsular bag.  The Helon was then removed from the eye with the I/A hand piece.  The anterior chamber was inflated with BSS and the wound was checked for leaks.  The lid speculum was removed and a drop of Ofloxacin 0.3% was placed in the operative eye.  The patient was brought to the recovery suite in stable condition.          2018 @  [facility]

## 2022-04-30 NOTE — OP NOTE
Patient:   Trever Del Toro            MRN: [id number]            FIN: [id number]               Age:   78 years     Sex:  Female     :  1940   Associated Diagnoses:   None   Author:   Farhat Childress MD      Phacoemulsification of Cataract with Intraocular Implant    Preoperative Diagnosis: Cataract Left Eye    Postoperative Diagnosis: Cataract Left Eye    Surgeon: Farhat Childress MD    Assistant: SEAN Najera    Anestheisa: Topical    Complications: None    After the patient underwent topical anesthesia along with IV sedation in the holding area, the patient was brought to the hospitals laser.  The patient was marked at 0 & 180 degrees.  A time out was performed.  The capsulotomy & lens fragmentation were completed.  Then the patient was brought to the operating suite.  The patient was prepped and draped in a sterile fashion.  A pediatric tegaderm and lid speculum were used to retract the upper and lower lashes and lids.  A 1.0mm paracentesis was then made at the 5 & 10 o'clock position.  Intraocular non-preserved 1% Xylocaine (4% diluted down to 1%) was irrigated into the anterior chamber.  Endocoat was injected into the eye.  A clear corneal incision was made with a 2.4mm Keratome blade.  BSS was used to hydro dissect the nucleus from the capsule.  The nucleus was then phacoemulsified with the Abbott machine for a EFX of 40.  The cortex was then removed with the I/A hand piece and Helon was placed into the posterior bag of the eye.  A posterior chamber implant [id number] of power 22.0 was placed in the capsular bag.  The Helon was then removed from the eye with the I/A hand piece.  The anterior chamber was inflated with BSS and the wound was checked for leaks.  The lid speculum was removed and a drop of Ofloxacin 0.3% was placed in the operative eye.  The patient was brought to the recovery suite in stable condition.          10/16/2018 @ [facility]

## 2022-06-28 ENCOUNTER — TELEPHONE (OUTPATIENT)
Dept: PRIMARY CARE CLINIC | Facility: CLINIC | Age: 82
End: 2022-06-28
Payer: MEDICARE

## 2022-06-28 DIAGNOSIS — N30.00 ACUTE CYSTITIS WITHOUT HEMATURIA: Primary | ICD-10-CM

## 2022-06-28 DIAGNOSIS — N30.00 ACUTE CYSTITIS WITHOUT HEMATURIA: ICD-10-CM

## 2022-06-28 DIAGNOSIS — N18.32 STAGE 3B CHRONIC KIDNEY DISEASE: Primary | ICD-10-CM

## 2022-06-28 DIAGNOSIS — E03.9 HYPOTHYROIDISM, UNSPECIFIED TYPE: ICD-10-CM

## 2022-06-28 LAB
APPEARANCE UR: ABNORMAL
BACTERIA #/AREA URNS AUTO: ABNORMAL /HPF
BILIRUB UR QL STRIP.AUTO: NEGATIVE MG/DL
COLOR UR AUTO: YELLOW
GLUCOSE UR QL STRIP.AUTO: NEGATIVE MG/DL
KETONES UR QL STRIP.AUTO: NEGATIVE MG/DL
LEUKOCYTE ESTERASE UR QL STRIP.AUTO: ABNORMAL UNIT/L
NITRITE UR QL STRIP.AUTO: NEGATIVE
PH UR STRIP.AUTO: 5.5 [PH]
PROT UR QL STRIP.AUTO: NEGATIVE MG/DL
RBC #/AREA URNS AUTO: <5 /HPF
RBC UR QL AUTO: NEGATIVE UNIT/L
SP GR UR STRIP.AUTO: 1.01 (ref 1–1.03)
SQUAMOUS #/AREA URNS AUTO: <5 /HPF
UROBILINOGEN UR STRIP-ACNC: 0.2 MG/DL
WBC #/AREA URNS AUTO: 102 /HPF

## 2022-06-28 PROCEDURE — 87077 CULTURE AEROBIC IDENTIFY: CPT | Performed by: FAMILY MEDICINE

## 2022-06-28 PROCEDURE — 81001 URINALYSIS AUTO W/SCOPE: CPT | Performed by: FAMILY MEDICINE

## 2022-06-28 RX ORDER — CIPROFLOXACIN 500 MG/1
500 TABLET ORAL 2 TIMES DAILY
Qty: 10 TABLET | Refills: 0 | Status: SHIPPED | OUTPATIENT
Start: 2022-06-28 | End: 2023-09-06

## 2022-06-30 LAB — BACTERIA UR CULT: ABNORMAL

## 2022-07-12 ENCOUNTER — LAB REQUISITION (OUTPATIENT)
Dept: LAB | Facility: HOSPITAL | Age: 82
End: 2022-07-12
Payer: MEDICARE

## 2022-07-12 DIAGNOSIS — N18.30 CHRONIC KIDNEY DISEASE, STAGE 3 UNSPECIFIED: ICD-10-CM

## 2022-07-12 LAB
APPEARANCE UR: CLEAR
BACTERIA #/AREA URNS AUTO: ABNORMAL /HPF
BILIRUB UR QL STRIP.AUTO: NEGATIVE MG/DL
COLOR UR AUTO: YELLOW
GLUCOSE UR QL STRIP.AUTO: NEGATIVE MG/DL
KETONES UR QL STRIP.AUTO: NEGATIVE MG/DL
LEUKOCYTE ESTERASE UR QL STRIP.AUTO: NEGATIVE UNIT/L
NITRITE UR QL STRIP.AUTO: NEGATIVE
PH UR STRIP.AUTO: 6.5 [PH]
PROT UR QL STRIP.AUTO: NEGATIVE MG/DL
RBC #/AREA URNS AUTO: <5 /HPF
RBC UR QL AUTO: NEGATIVE UNIT/L
SP GR UR STRIP.AUTO: 1.02 (ref 1–1.03)
SQUAMOUS #/AREA URNS AUTO: 13 /HPF
UROBILINOGEN UR STRIP-ACNC: 0.2 MG/DL
WBC #/AREA URNS AUTO: <5 /HPF

## 2022-07-12 PROCEDURE — 81001 URINALYSIS AUTO W/SCOPE: CPT | Performed by: FAMILY MEDICINE

## 2022-07-28 ENCOUNTER — OFFICE VISIT (OUTPATIENT)
Dept: URGENT CARE | Facility: CLINIC | Age: 82
End: 2022-07-28
Payer: MEDICARE

## 2022-07-28 VITALS
SYSTOLIC BLOOD PRESSURE: 158 MMHG | DIASTOLIC BLOOD PRESSURE: 77 MMHG | HEIGHT: 62 IN | HEART RATE: 84 BPM | WEIGHT: 124 LBS | BODY MASS INDEX: 22.82 KG/M2 | OXYGEN SATURATION: 96 % | TEMPERATURE: 99 F

## 2022-07-28 DIAGNOSIS — S52.502A CLOSED FRACTURE OF DISTAL END OF LEFT RADIUS, UNSPECIFIED FRACTURE MORPHOLOGY, INITIAL ENCOUNTER: Primary | ICD-10-CM

## 2022-07-28 DIAGNOSIS — M25.532 LEFT WRIST PAIN: ICD-10-CM

## 2022-07-28 PROCEDURE — 99213 OFFICE O/P EST LOW 20 MIN: CPT | Mod: ,,, | Performed by: FAMILY MEDICINE

## 2022-07-28 PROCEDURE — 99213 PR OFFICE/OUTPT VISIT, EST, LEVL III, 20-29 MIN: ICD-10-PCS | Mod: ,,, | Performed by: FAMILY MEDICINE

## 2022-07-28 NOTE — PATIENT INSTRUCTIONS
Reviewed the x-rays and results with patient.  Orthopedic referral in the chart.  Rest, ice, compression and elevation.  ER precautions with any acute change in symptoms  Patient desires to follow up with

## 2022-07-28 NOTE — PROGRESS NOTES
"Subjective:       Patient ID: Trever Del Toro is a 82 y.o. female.    Vitals:  height is 5' 2" (1.575 m) and weight is 56.2 kg (124 lb). Her temperature is 98.9 °F (37.2 °C). Her blood pressure is 158/77 (abnormal) and her pulse is 84. Her oxygen saturation is 96%.     Chief Complaint: Fall (Fell and hurt left hand this AM)    Fall  The accident occurred 6 to 12 hours ago. The fall occurred while walking. She fell from a height of 1 to 2 ft. She landed on concrete. There was no blood loss. The point of impact was the left wrist, left knee and right knee. The pain is present in the left hand. The pain is at a severity of 8/10. The pain is moderate. The symptoms are aggravated by movement. She has tried nothing for the symptoms. The treatment provided no relief.     ROS    Objective:      Physical Exam      Assessment:       No diagnosis found.      Plan:         There are no diagnoses linked to this encounter.               "

## 2022-07-28 NOTE — PROGRESS NOTES
"Subjective:       Patient ID: Trever Del Toro is a 82 y.o. female.    Vitals:  height is 5' 2" (1.575 m) and weight is 56.2 kg (124 lb). Her temperature is 98.9 °F (37.2 °C). Her blood pressure is 158/77 (abnormal) and her pulse is 84. Her oxygen saturation is 96%.     Chief Complaint: Fall (Fell and hurt left hand this AM)    HPI:  82-year-old female present to clinic with concerns of left hand pain since morning.  States accidentally fell on the sidewalk, states did not realize it was uneven surface..  No head injury no loss of consciousness.  Denies tingling numbness or weakness to upper extremities.  For all other medical condition follows up with primary MD Dr. Stacy.  Comorbidities include hypothyroidism, elevated cholesterol and acid reflux  ROS :  Constitutional : No fever, no fatigue  Neck : Negative except HPI  Respiratory : No shortness of breath, no wheezing  Cardiovascular : No chest pain  Musculoskeletal : Negative except HPI  Integumentary : No rash, no abnormal lesion  Neurological : Negative for tingling numbness and weakness     Objective:      Physical Exam    General : Alert and oriented, No apparent distress, Afebrile  Neck -: supple, Non Tender  Respiratory :Lungs are clear to auscultate, Breath sounds are equal  Cardiovascular : Normal rate, normal volume pulse bilateral  Musculoskeletal :  Left hand thenar area appears bruised, swollen tender to palpate.  Left wrist radially mild fullness, tender to palpate.  Very minimal bruising.  Bilateral knee anteriorly superficial skin abrasion.  Patient has covered with Band-Aid  Integumentary : Warm, Dry   Neurologic : Alert and Oriented X 4, sensations intact, motor intact    Impression:     Findings concerning for subtle nondisplaced distal radius fracture.  Repeat examination in 5-7 days after immobilization or CT scan correlation of this area may be beneficial.    Assessment:       1. Closed fracture of distal end of left radius, unspecified " fracture morphology, initial encounter    2. Left wrist pain          Plan:     reviewed the x-rays and results with patient.  Orthopedic referral in the chart.  Rest, ice, compression and elevation.  ER precautions with any acute change in symptoms  Patient desires to follow up with     Closed fracture of distal end of left radius, unspecified fracture morphology, initial encounter  -     Ambulatory referral/consult to Orthopedics    Left wrist pain  -     XR WRIST COMPLETE 3 VIEWS LEFT; Future; Expected date: 07/28/2022

## 2022-08-01 ENCOUNTER — OFFICE VISIT (OUTPATIENT)
Dept: ORTHOPEDICS | Facility: CLINIC | Age: 82
End: 2022-08-01
Payer: MEDICARE

## 2022-08-01 VITALS
HEIGHT: 62 IN | DIASTOLIC BLOOD PRESSURE: 81 MMHG | HEART RATE: 96 BPM | BODY MASS INDEX: 23.22 KG/M2 | SYSTOLIC BLOOD PRESSURE: 144 MMHG | TEMPERATURE: 98 F | WEIGHT: 126.19 LBS

## 2022-08-01 DIAGNOSIS — S52.502A CLOSED FRACTURE OF DISTAL END OF LEFT RADIUS, UNSPECIFIED FRACTURE MORPHOLOGY, INITIAL ENCOUNTER: Primary | ICD-10-CM

## 2022-08-01 DIAGNOSIS — M25.532 LEFT WRIST PAIN: ICD-10-CM

## 2022-08-01 PROCEDURE — 99203 OFFICE O/P NEW LOW 30 MIN: CPT | Mod: 57,,, | Performed by: REHABILITATION UNIT

## 2022-08-01 PROCEDURE — 99203 PR OFFICE/OUTPT VISIT, NEW, LEVL III, 30-44 MIN: ICD-10-PCS | Mod: 57,,, | Performed by: REHABILITATION UNIT

## 2022-08-01 PROCEDURE — 25600 PR CLOSED RX DIST RAD/ULNA FX: ICD-10-PCS | Mod: LT,,, | Performed by: REHABILITATION UNIT

## 2022-08-01 PROCEDURE — 25600 CLTX DST RDL FX/EPHYS SEP WO: CPT | Mod: LT,,, | Performed by: REHABILITATION UNIT

## 2022-08-01 RX ORDER — ESTRADIOL 0.1 MG/G
1 CREAM VAGINAL
COMMUNITY
Start: 2022-04-18

## 2022-08-01 NOTE — PROGRESS NOTES
Subjective:      Chief Complaint   Patient presents with    Wrist Pain     NP, denies any previous sx or injury to left wrist, presents for left wrist injury. States she fell last thursday 7/28/22. Denies any pain, just reports slight discomfort due to brace and swelling. States went to walk in clinic and was told she has small fx.          Trever Del Toro is a 82 y.o. right hand-dominant female referred by urgent care for evaluation and treatment of a left wrist injury. This is evaluated as a personal injury. She fell on 7/28/22 and landed on her wrist. XR at urgent care were concerning for DR yemi. She was placed into a removable brace.  She reports some associated swelling and ecchymosis.  Overall she is having little pain.  She is more bothered by her brace.  No sensory or motor changes reported distally.  She has taken Tylenol occasionally.    Review of Systems  A comprehensive review of systems was negative.      Objective:      General:   alert, appears stated age and cooperative   Gait:    Normal   Left Hand  Circulation:   warm, well perfused, brisk capillary refill   Skin:   intact without lesion, ecchymosis to volar wrist   Swelling:  absent   Deformity:  There is not an obvious deformity about the wrist   Finger ROM:   normal   Wrist ROM:   normal   Elbow ROM:  normal and painless   Sensation:   intact to light touch   Tenderness:    none  5/5 ain/pin/uln      Imaging  XR WRIST COMPLETE 3 VIEWS LEFT  Narrative: EXAMINATION:  XR WRIST COMPLETE 3 VIEWS LEFT    CLINICAL HISTORY:  Fell this am walking on a side walk. Left wrist and hand pain; Pain in left wrist    TECHNIQUE:  PA, lateral, and oblique views of the left wrist were performed.    COMPARISON:  None    FINDINGS:  There is a subtle lucency at the distal radial epiphysis at the base of the styloid seen on the AP view as well as the lateral view.  Findings may represent subtle nondisplaced distal radial fracture.  The remainder of the bones appear  unremarkable.  There is soft tissue swelling of the wrist.  Impression: Findings concerning for subtle nondisplaced distal radius fracture.  Repeat examination in 5-7 days after immobilization or CT scan correlation of this area may be beneficial.    Electronically signed by: Edvin Arango  Date:    07/28/2022  Time:    13:38     Assessment:     82F s/p fall with nondisplaced DR fx     Plan:     Imaging and exam findings discussed with the patient.  She sustained a fall and landed on her left wrist.  Subtle cortical irregularity at the distal radius.  Continue bracing.  Rest and elevation of the extremity.  Ice and over-the-counter medications as needed.  Encouraged continued motion of the hand and fingers.  Follow-up in 4 weeks with repeat radiographs for fracture care.  All questions were answered and she was in agreement.

## 2022-08-17 ENCOUNTER — OFFICE VISIT (OUTPATIENT)
Dept: PRIMARY CARE CLINIC | Facility: CLINIC | Age: 82
End: 2022-08-17
Payer: MEDICARE

## 2022-08-17 VITALS
OXYGEN SATURATION: 97 % | SYSTOLIC BLOOD PRESSURE: 139 MMHG | HEART RATE: 87 BPM | BODY MASS INDEX: 23.28 KG/M2 | WEIGHT: 127.31 LBS | DIASTOLIC BLOOD PRESSURE: 78 MMHG

## 2022-08-17 DIAGNOSIS — E78.5 HYPERLIPIDEMIA, UNSPECIFIED HYPERLIPIDEMIA TYPE: ICD-10-CM

## 2022-08-17 DIAGNOSIS — F01.50 VASCULAR DEMENTIA WITHOUT BEHAVIORAL DISTURBANCE: ICD-10-CM

## 2022-08-17 DIAGNOSIS — E55.9 VITAMIN D DEFICIENCY: ICD-10-CM

## 2022-08-17 DIAGNOSIS — Z00.00 WELLNESS EXAMINATION: Primary | ICD-10-CM

## 2022-08-17 DIAGNOSIS — D64.9 ANEMIA, UNSPECIFIED TYPE: ICD-10-CM

## 2022-08-17 DIAGNOSIS — S52.502A CLOSED FRACTURE OF DISTAL END OF LEFT RADIUS, UNSPECIFIED FRACTURE MORPHOLOGY, INITIAL ENCOUNTER: ICD-10-CM

## 2022-08-17 DIAGNOSIS — E03.9 HYPOTHYROIDISM, UNSPECIFIED TYPE: ICD-10-CM

## 2022-08-17 PROCEDURE — 99214 OFFICE O/P EST MOD 30 MIN: CPT | Mod: ,,, | Performed by: FAMILY MEDICINE

## 2022-08-17 PROCEDURE — 99214 PR OFFICE/OUTPT VISIT, EST, LEVL IV, 30-39 MIN: ICD-10-PCS | Mod: ,,, | Performed by: FAMILY MEDICINE

## 2022-08-17 RX ORDER — OMEPRAZOLE 20 MG/1
20 CAPSULE, DELAYED RELEASE ORAL DAILY
Qty: 90 CAPSULE | Refills: 3 | Status: SHIPPED | OUTPATIENT
Start: 2022-08-17 | End: 2022-09-12

## 2022-08-17 RX ORDER — LEVOTHYROXINE SODIUM 75 UG/1
75 TABLET ORAL
Qty: 90 TABLET | Refills: 3 | Status: SHIPPED | OUTPATIENT
Start: 2022-08-17 | End: 2023-02-23 | Stop reason: SDUPTHER

## 2022-08-17 RX ORDER — ESCITALOPRAM OXALATE 10 MG/1
5 TABLET ORAL DAILY
Qty: 45 TABLET | Refills: 3 | Status: SHIPPED | OUTPATIENT
Start: 2022-08-17 | End: 2022-10-25

## 2022-08-17 RX ORDER — PRAVASTATIN SODIUM 20 MG/1
20 TABLET ORAL NIGHTLY
Qty: 90 TABLET | Refills: 3 | Status: SHIPPED | OUTPATIENT
Start: 2022-08-17 | End: 2022-09-12

## 2022-08-17 NOTE — PROGRESS NOTES
Chief Complaint  Chief Complaint   Patient presents with    Follow-up     With labs       History of Present Illness  Patient presents to clinic for routine scheduled follow-up visit with lab review.  Wellness visit.  Blood work performed prior to this visit includes vitamin-D at 32 with chemistry panel with only mild elevation of creatinine of 1.07 which is improved from previous check with thyroid function studies all within normal limits.  The patient states that she is making attempts to increase her fluid intake for adequate hydration with blood pressure within normal range in clinic at this time.  She does report a fall that occurred 3 weeks ago with a follow-up to urgent care and was noted to have a nondisplaced fracture to the left wrist with Velcro brace in place with follow-up to Orthopedics with re-evaluation at 4-6 weeks scheduled.    PMH:  Urinary incontinence-status post surgical correction x3 with inadequate improvement with oxybutynin and Myrbetriq and Estrace   Hypothyroidism-Synthroid 75 mcg daily  CVA history-81 mg aspirin daily   Hyperlipidemia-pravastatin   Depression-Lexapro 5 mg daily    Review of Systems  Review of Systems   Constitutional: Negative for chills and fever.   HENT: Negative for congestion and sore throat.    Eyes: Negative for redness and visual disturbance.   Respiratory: Negative for cough and shortness of breath.    Cardiovascular: Negative for chest pain and palpitations.   Gastrointestinal: Negative for nausea and vomiting.   Musculoskeletal: Negative for arthralgias and myalgias.        Left wrist pain without swelling   Skin: Negative for pallor and rash.   Neurological: Negative for dizziness and headaches.   Psychiatric/Behavioral: Negative for agitation and dysphoric mood.       Physical Exam  Physical Exam  Constitutional:       Appearance: Normal appearance.   HENT:      Head: Normocephalic and atraumatic.   Eyes:      General: No scleral icterus.      Conjunctiva/sclera: Conjunctivae normal.   Cardiovascular:      Rate and Rhythm: Normal rate and regular rhythm.   Pulmonary:      Effort: Pulmonary effort is normal.      Breath sounds: Normal breath sounds.   Musculoskeletal:      Comments: Left wrist in Velcro cast   Skin:     General: Skin is warm and dry.   Neurological:      General: No focal deficit present.      Mental Status: She is alert and oriented to person, place, and time.   Psychiatric:         Mood and Affect: Mood normal.         Behavior: Behavior normal.         Problem List/Past Medical History  Past Medical History:   Diagnosis Date    Anticoagulant long-term use     asa 81mg    High cholesterol     Stroke     TIA many years ago // no deficeit    Thyroid disease        Procedure/Surgical History  Past Surgical History:   Procedure Laterality Date    DILATION AND CURETTAGE OF UTERUS      GALLBLADDER SURGERY      HYSTERECTOMY      REPAIR OF RECTOCELE      REVISION OF PUBOVAGINAL SLING N/A 5/4/2021    Procedure: REVISION, PUBOVAGINAL SLING;  Surgeon: Evin Miguel MD;  Location: [facility] OR [address] [place] [address];  Service: Urology;  Laterality: N/A;  1hr    TONSILLECTOMY         Medications  Current Outpatient Medications on File Prior to Visit   Medication Sig Dispense Refill    aspirin (ECOTRIN) 81 MG EC tablet Take 81 mg by mouth once daily.      ciprofloxacin HCl (CIPRO) 500 MG tablet Take 1 tablet (500 mg total) by mouth 2 (two) times daily. 10 tablet 0    estradioL (ESTRACE) 0.01 % (0.1 mg/gram) vaginal cream Place 1 g vaginally.      levothyroxine (SYNTHROID) 50 MCG tablet Take by mouth.      vitamin D (VITAMIN D3) 1000 units Tab Take 1,000 Units by mouth once daily.      [DISCONTINUED] EScitalopram oxalate (LEXAPRO) 10 MG tablet Take 5 mg by mouth once daily.      [DISCONTINUED] levothyroxine (SYNTHROID) 75 MCG tablet Take by mouth.      [DISCONTINUED] omeprazole (PRILOSEC) 20 MG capsule Take 20 mg by mouth.      [DISCONTINUED]  pravastatin (PRAVACHOL) 20 MG tablet Take 20 mg by mouth.      solifenacin (VESICARE) 10 MG tablet Take 1 tablet (10 mg total) by mouth once daily. 30 tablet 11     No current facility-administered medications on file prior to visit.       Allegies  Review of patient's allergies indicates:   Allergen Reactions    Cephalexin Other (See Comments)    Amoxicillin-pot clavulanate Nausea And Vomiting    Codeine Nausea And Vomiting        Social History  Social History     Socioeconomic History    Marital status:    Tobacco Use    Smoking status: Never Smoker    Smokeless tobacco: Never Used   Substance and Sexual Activity    Alcohol use: Not Currently    Drug use: Never    Sexual activity: Not Currently       Family History  Family History   Family history unknown: Yes         Immunizations    There is no immunization history on file for this patient.    Health Maintenance  Health Maintenance   Topic Date Due    TETANUS VACCINE  Never done    DEXA Scan  Never done    Lipid Panel  04/15/2027        Assessment / Plan  Problem List Items Addressed This Visit        Neuro    Vascular dementia without behavioral disturbance       Cardiac/Vascular    Hyperlipidemia    Current Assessment & Plan     Recommend continuation of dyslipidemia diet with statin medication and repeat FLP at routine interval.              Endocrine    Hypothyroidism    Current Assessment & Plan     Continue current medication regimen as noted above with recheck of thyroid function studies at routine interval.              Orthopedic    Closed fracture of left distal radius    Current Assessment & Plan     Currently 3 weeks into healing from wrist brace, follow-up with orthopedics for routine healing at 4-6 weeks as recommended and monitor for any acute worsening of symptoms              Other    Wellness examination - Primary    Current Assessment & Plan     Discussed routine annual health maintenance and screening in addition to acute  issues noted below.             Other Visit Diagnoses     Vitamin D deficiency        Anemia, unspecified type              RTC 6 months    Gustavo Butler

## 2022-08-17 NOTE — ASSESSMENT & PLAN NOTE
Currently 3 weeks into healing from wrist brace, follow-up with orthopedics for routine healing at 4-6 weeks as recommended and monitor for any acute worsening of symptoms

## 2022-08-17 NOTE — ASSESSMENT & PLAN NOTE
Continue current medication regimen as noted above with recheck of thyroid function studies at routine interval.

## 2022-08-31 ENCOUNTER — HOSPITAL ENCOUNTER (OUTPATIENT)
Dept: RADIOLOGY | Facility: CLINIC | Age: 82
Discharge: HOME | End: 2022-08-31
Attending: REHABILITATION UNIT
Payer: MEDICARE

## 2022-08-31 ENCOUNTER — OFFICE VISIT (OUTPATIENT)
Dept: ORTHOPEDICS | Facility: CLINIC | Age: 82
End: 2022-08-31
Payer: MEDICARE

## 2022-08-31 VITALS — HEIGHT: 62 IN | BODY MASS INDEX: 23.37 KG/M2 | WEIGHT: 127 LBS

## 2022-08-31 DIAGNOSIS — M25.532 LEFT WRIST PAIN: Primary | ICD-10-CM

## 2022-08-31 DIAGNOSIS — M25.532 LEFT WRIST PAIN: ICD-10-CM

## 2022-08-31 PROCEDURE — 73110 XR WRIST COMPLETE 3 VIEWS LEFT: ICD-10-PCS | Mod: LT,,, | Performed by: REHABILITATION UNIT

## 2022-08-31 PROCEDURE — 73110 X-RAY EXAM OF WRIST: CPT | Mod: LT,,, | Performed by: REHABILITATION UNIT

## 2022-08-31 PROCEDURE — 99024 POSTOP FOLLOW-UP VISIT: CPT | Mod: POP,,, | Performed by: REHABILITATION UNIT

## 2022-08-31 PROCEDURE — 99024 PR POST-OP FOLLOW-UP VISIT: ICD-10-PCS | Mod: POP,,, | Performed by: REHABILITATION UNIT

## 2022-08-31 NOTE — PROGRESS NOTES
Subjective:      Chief Complaint   Patient presents with    Left Wrist - Injury    Follow-up     4wk F/U Closed fx of distal end of Left radius, DOI: 7/28/22, patient states it feels alot better just hates the feeling of having the exsos on      Trever Del Toro is a 82 y.o. right hand-dominant female here for follow up of her left DR yemi.  She is doing very well.  She has been compliant with her brace.  She denies any pain.  No sensory changes. She has remained active.     Initial HPI:  Referred by urgent care for evaluation and treatment of a left wrist injury. This is evaluated as a personal injury. She fell on 7/28/22 and landed on her wrist. XR at urgent care were concerning for DR bragg. She was placed into a removable brace.  She reports some associated swelling and ecchymosis.  Overall she is having little pain.  She is more bothered by her brace.  No sensory or motor changes reported distally.  She has taken Tylenol occasionally.    Review of Systems  A comprehensive review of systems was negative.      Objective:      General:   alert, appears stated age and cooperative   Gait:    Normal   Left Hand  Circulation:   warm, well perfused, brisk capillary refill   Skin:   intact without lesion    Swelling:  absent   Deformity:  There is not an obvious deformity about the wrist   Finger ROM:   normal   Wrist ROM:   normal   Elbow ROM:  normal and painless   Sensation:   intact to light touch   Tenderness:    none  5/5 ain/pin/uln      Imaging  Three-view radiographs of the left wrist obtained today personally reviewed showing no appreciable fracture or dislocation.  Overall alignment is maintained.  Visualized joint spaces intact.  No significant soft tissue swelling.     Assessment:     82F s/p fall with nondisplaced DR bragg     Plan:     Imaging and exam findings discussed with the patient.  She is doing very well today.  She denies any pain.  No pain on exam and she has great motion and strength.  She may wean from  her brace.  She will follow up as needed. All questions were answered and she was in agreement.

## 2022-09-12 DIAGNOSIS — E03.9 HYPOTHYROIDISM, UNSPECIFIED TYPE: ICD-10-CM

## 2022-09-12 DIAGNOSIS — K21.9 GASTROESOPHAGEAL REFLUX DISEASE, UNSPECIFIED WHETHER ESOPHAGITIS PRESENT: Primary | ICD-10-CM

## 2022-09-12 DIAGNOSIS — E78.5 HYPERLIPIDEMIA, UNSPECIFIED: ICD-10-CM

## 2022-09-12 RX ORDER — OMEPRAZOLE 20 MG/1
CAPSULE, DELAYED RELEASE ORAL
Qty: 30 CAPSULE | Refills: 0 | Status: SHIPPED | OUTPATIENT
Start: 2022-09-12 | End: 2022-09-12

## 2022-09-12 RX ORDER — PRAVASTATIN SODIUM 20 MG/1
20 TABLET ORAL DAILY
Qty: 90 TABLET | Refills: 3 | Status: SHIPPED | OUTPATIENT
Start: 2022-09-12 | End: 2023-02-23 | Stop reason: SDUPTHER

## 2022-09-12 RX ORDER — OMEPRAZOLE 20 MG/1
20 CAPSULE, DELAYED RELEASE ORAL DAILY
Qty: 90 CAPSULE | Refills: 3 | Status: SHIPPED | OUTPATIENT
Start: 2022-09-12 | End: 2023-02-23 | Stop reason: SDUPTHER

## 2022-09-12 RX ORDER — LEVOTHYROXINE SODIUM 50 UG/1
50 TABLET ORAL
Qty: 90 TABLET | Refills: 3 | Status: SHIPPED | OUTPATIENT
Start: 2022-09-12

## 2022-09-12 RX ORDER — PRAVASTATIN SODIUM 20 MG/1
TABLET ORAL
Qty: 30 TABLET | Refills: 0 | Status: SHIPPED | OUTPATIENT
Start: 2022-09-12 | End: 2022-09-12

## 2022-09-16 ENCOUNTER — HISTORICAL (OUTPATIENT)
Dept: ADMINISTRATIVE | Facility: HOSPITAL | Age: 82
End: 2022-09-16
Payer: MEDICARE

## 2022-10-25 DIAGNOSIS — F41.9 ANXIETY: ICD-10-CM

## 2022-10-25 DIAGNOSIS — F41.9 ANXIETY: Primary | ICD-10-CM

## 2022-10-25 RX ORDER — ESCITALOPRAM OXALATE 10 MG/1
10 TABLET ORAL DAILY
Qty: 90 TABLET | Refills: 3 | Status: SHIPPED | OUTPATIENT
Start: 2022-10-25 | End: 2022-10-25 | Stop reason: SDUPTHER

## 2022-10-25 RX ORDER — ESCITALOPRAM OXALATE 10 MG/1
10 TABLET ORAL DAILY
Qty: 90 TABLET | Refills: 3 | Status: SHIPPED | OUTPATIENT
Start: 2022-10-25 | End: 2023-02-23 | Stop reason: SDUPTHER

## 2022-11-21 PROBLEM — Z00.00 WELLNESS EXAMINATION: Status: RESOLVED | Noted: 2022-08-17 | Resolved: 2022-11-21

## 2023-02-09 ENCOUNTER — CLINICAL SUPPORT (OUTPATIENT)
Dept: PRIMARY CARE CLINIC | Facility: CLINIC | Age: 83
End: 2023-02-09
Payer: MEDICARE

## 2023-02-09 DIAGNOSIS — D64.9 ANEMIA, UNSPECIFIED TYPE: ICD-10-CM

## 2023-02-09 DIAGNOSIS — E03.9 HYPOTHYROIDISM, UNSPECIFIED TYPE: ICD-10-CM

## 2023-02-09 DIAGNOSIS — Z00.00 WELLNESS EXAMINATION: Primary | ICD-10-CM

## 2023-02-09 DIAGNOSIS — E78.5 HYPERLIPIDEMIA, UNSPECIFIED HYPERLIPIDEMIA TYPE: ICD-10-CM

## 2023-02-09 DIAGNOSIS — Z00.00 WELLNESS EXAMINATION: ICD-10-CM

## 2023-02-09 DIAGNOSIS — E55.9 VITAMIN D DEFICIENCY: ICD-10-CM

## 2023-02-09 LAB
ALBUMIN SERPL-MCNC: 3.9 G/DL (ref 3.4–4.8)
ALBUMIN/GLOB SERPL: 1.4 RATIO (ref 1.1–2)
ALP SERPL-CCNC: 55 UNIT/L (ref 40–150)
ALT SERPL-CCNC: 15 UNIT/L (ref 0–55)
APPEARANCE UR: CLEAR
AST SERPL-CCNC: 23 UNIT/L (ref 5–34)
BACTERIA #/AREA URNS AUTO: NORMAL /HPF
BASOPHILS # BLD AUTO: 0.03 X10(3)/MCL (ref 0–0.2)
BASOPHILS NFR BLD AUTO: 0.5 %
BILIRUB UR QL STRIP.AUTO: NEGATIVE MG/DL
BILIRUBIN DIRECT+TOT PNL SERPL-MCNC: 0.6 MG/DL
BUN SERPL-MCNC: 23.4 MG/DL (ref 9.8–20.1)
CALCIUM SERPL-MCNC: 9.4 MG/DL (ref 8.4–10.2)
CHLORIDE SERPL-SCNC: 102 MMOL/L (ref 98–107)
CHOLEST SERPL-MCNC: 213 MG/DL
CHOLEST/HDLC SERPL: 4 {RATIO} (ref 0–5)
CO2 SERPL-SCNC: 30 MMOL/L (ref 23–31)
COLOR UR AUTO: YELLOW
CREAT SERPL-MCNC: 1.21 MG/DL (ref 0.55–1.02)
DEPRECATED CALCIDIOL+CALCIFEROL SERPL-MC: 35.7 NG/ML (ref 30–80)
EOSINOPHIL # BLD AUTO: 0.26 X10(3)/MCL (ref 0–0.9)
EOSINOPHIL NFR BLD AUTO: 4.4 %
ERYTHROCYTE [DISTWIDTH] IN BLOOD BY AUTOMATED COUNT: 12.2 % (ref 11.5–17)
GFR SERPLBLD CREATININE-BSD FMLA CKD-EPI: 45 MLS/MIN/1.73/M2
GLOBULIN SER-MCNC: 2.8 GM/DL (ref 2.4–3.5)
GLUCOSE SERPL-MCNC: 111 MG/DL (ref 82–115)
GLUCOSE UR QL STRIP.AUTO: NEGATIVE MG/DL
HCT VFR BLD AUTO: 39.8 % (ref 37–47)
HDLC SERPL-MCNC: 59 MG/DL (ref 35–60)
HGB BLD-MCNC: 13.1 GM/DL (ref 12–16)
IMM GRANULOCYTES # BLD AUTO: 0.02 X10(3)/MCL (ref 0–0.04)
IMM GRANULOCYTES NFR BLD AUTO: 0.3 %
KETONES UR QL STRIP.AUTO: NEGATIVE MG/DL
LDLC SERPL CALC-MCNC: 138 MG/DL (ref 50–140)
LEUKOCYTE ESTERASE UR QL STRIP.AUTO: ABNORMAL UNIT/L
LYMPHOCYTES # BLD AUTO: 1.45 X10(3)/MCL (ref 0.6–4.6)
LYMPHOCYTES NFR BLD AUTO: 24.7 %
MCH RBC QN AUTO: 29.6 PG
MCHC RBC AUTO-ENTMCNC: 32.9 MG/DL (ref 33–36)
MCV RBC AUTO: 89.8 FL (ref 80–94)
MONOCYTES # BLD AUTO: 0.6 X10(3)/MCL (ref 0.1–1.3)
MONOCYTES NFR BLD AUTO: 10.2 %
NEUTROPHILS # BLD AUTO: 3.5 X10(3)/MCL (ref 2.1–9.2)
NEUTROPHILS NFR BLD AUTO: 59.9 %
NITRITE UR QL STRIP.AUTO: NEGATIVE
NRBC BLD AUTO-RTO: 0 %
PH UR STRIP.AUTO: 7.5 [PH]
PLATELET # BLD AUTO: 276 X10(3)/MCL (ref 130–400)
PMV BLD AUTO: 10.2 FL (ref 7.4–10.4)
POTASSIUM SERPL-SCNC: 4.1 MMOL/L (ref 3.5–5.1)
PROT SERPL-MCNC: 6.7 GM/DL (ref 5.8–7.6)
PROT UR QL STRIP.AUTO: NEGATIVE MG/DL
RBC # BLD AUTO: 4.43 X10(6)/MCL (ref 4.2–5.4)
RBC #/AREA URNS AUTO: <5 /HPF
RBC UR QL AUTO: NEGATIVE UNIT/L
SODIUM SERPL-SCNC: 141 MMOL/L (ref 136–145)
SP GR UR STRIP.AUTO: 1.02 (ref 1–1.03)
SQUAMOUS #/AREA URNS AUTO: <5 /HPF
TRIGL SERPL-MCNC: 78 MG/DL (ref 37–140)
TSH SERPL-ACNC: 1.29 UIU/ML (ref 0.35–4.94)
UROBILINOGEN UR STRIP-ACNC: 1 MG/DL
VLDLC SERPL CALC-MCNC: 16 MG/DL
WBC # SPEC AUTO: 5.9 X10(3)/MCL (ref 4.5–11.5)
WBC #/AREA URNS AUTO: <5 /HPF

## 2023-02-09 PROCEDURE — 82306 VITAMIN D 25 HYDROXY: CPT | Performed by: FAMILY MEDICINE

## 2023-02-09 PROCEDURE — 85025 COMPLETE CBC W/AUTO DIFF WBC: CPT | Performed by: FAMILY MEDICINE

## 2023-02-09 PROCEDURE — 80061 LIPID PANEL: CPT | Performed by: FAMILY MEDICINE

## 2023-02-09 PROCEDURE — 36415 COLL VENOUS BLD VENIPUNCTURE: CPT

## 2023-02-09 PROCEDURE — 81001 URINALYSIS AUTO W/SCOPE: CPT | Performed by: FAMILY MEDICINE

## 2023-02-09 PROCEDURE — 84443 ASSAY THYROID STIM HORMONE: CPT | Performed by: FAMILY MEDICINE

## 2023-02-09 PROCEDURE — 80053 COMPREHEN METABOLIC PANEL: CPT | Performed by: FAMILY MEDICINE

## 2023-02-09 PROCEDURE — 36415 PR COLLECTION VENOUS BLOOD,VENIPUNCTURE: ICD-10-PCS | Mod: ,,, | Performed by: FAMILY MEDICINE

## 2023-02-09 PROCEDURE — 36415 COLL VENOUS BLD VENIPUNCTURE: CPT | Mod: ,,, | Performed by: FAMILY MEDICINE

## 2023-02-16 ENCOUNTER — OFFICE VISIT (OUTPATIENT)
Dept: PRIMARY CARE CLINIC | Facility: CLINIC | Age: 83
End: 2023-02-16
Payer: MEDICARE

## 2023-02-16 VITALS
DIASTOLIC BLOOD PRESSURE: 74 MMHG | SYSTOLIC BLOOD PRESSURE: 136 MMHG | BODY MASS INDEX: 24.14 KG/M2 | HEART RATE: 79 BPM | OXYGEN SATURATION: 96 % | WEIGHT: 132 LBS

## 2023-02-16 DIAGNOSIS — T78.40XA ALLERGIC REACTION, INITIAL ENCOUNTER: ICD-10-CM

## 2023-02-16 DIAGNOSIS — T78.40XA ALLERGIC REACTION, INITIAL ENCOUNTER: Primary | ICD-10-CM

## 2023-02-16 DIAGNOSIS — F01.50 VASCULAR DEMENTIA WITHOUT BEHAVIORAL DISTURBANCE: ICD-10-CM

## 2023-02-16 DIAGNOSIS — H53.2 TRANSIENT DIPLOPIA: Primary | ICD-10-CM

## 2023-02-16 DIAGNOSIS — N18.31 STAGE 3A CHRONIC KIDNEY DISEASE: ICD-10-CM

## 2023-02-16 PROCEDURE — 99214 PR OFFICE/OUTPT VISIT, EST, LEVL IV, 30-39 MIN: ICD-10-PCS | Mod: ,,, | Performed by: FAMILY MEDICINE

## 2023-02-16 PROCEDURE — 99214 OFFICE O/P EST MOD 30 MIN: CPT | Mod: ,,, | Performed by: FAMILY MEDICINE

## 2023-02-16 RX ORDER — METHYLPREDNISOLONE 4 MG/1
TABLET ORAL
Qty: 21 EACH | Refills: 0 | Status: SHIPPED | OUTPATIENT
Start: 2023-02-16 | End: 2023-02-16 | Stop reason: SDUPTHER

## 2023-02-16 RX ORDER — METHYLPREDNISOLONE 4 MG/1
TABLET ORAL
Qty: 21 EACH | Refills: 0 | Status: SHIPPED | OUTPATIENT
Start: 2023-02-16

## 2023-02-16 RX ORDER — FAMOTIDINE 20 MG/1
20 TABLET, FILM COATED ORAL DAILY
Qty: 30 TABLET | Refills: 0 | Status: SHIPPED | OUTPATIENT
Start: 2023-02-16 | End: 2024-02-16

## 2023-02-16 RX ORDER — METHYLPREDNISOLONE 4 MG/1
TABLET ORAL
Qty: 21 EACH | Refills: 0 | OUTPATIENT
Start: 2023-02-16 | End: 2023-02-16 | Stop reason: SDUPTHER

## 2023-02-16 RX ORDER — TRIAMCINOLONE ACETONIDE 1 MG/G
CREAM TOPICAL 2 TIMES DAILY
Qty: 45 G | Refills: 0 | Status: SHIPPED | OUTPATIENT
Start: 2023-02-16 | End: 2023-02-16 | Stop reason: SDUPTHER

## 2023-02-16 RX ORDER — CETIRIZINE HYDROCHLORIDE 10 MG/1
10 TABLET ORAL DAILY
Qty: 30 TABLET | Refills: 0 | Status: SHIPPED | OUTPATIENT
Start: 2023-02-16 | End: 2023-03-10

## 2023-02-16 RX ORDER — FAMOTIDINE 20 MG/1
20 TABLET, FILM COATED ORAL DAILY
Qty: 30 TABLET | Refills: 0 | Status: SHIPPED | OUTPATIENT
Start: 2023-02-16 | End: 2023-02-16 | Stop reason: SDUPTHER

## 2023-02-16 RX ORDER — CETIRIZINE HYDROCHLORIDE 10 MG/1
10 TABLET ORAL DAILY
Qty: 30 TABLET | Refills: 0 | Status: SHIPPED | OUTPATIENT
Start: 2023-02-16 | End: 2023-02-16 | Stop reason: SDUPTHER

## 2023-02-16 RX ORDER — TRIAMCINOLONE ACETONIDE 1 MG/G
CREAM TOPICAL 2 TIMES DAILY
Qty: 45 G | Refills: 0 | Status: SHIPPED | OUTPATIENT
Start: 2023-02-16

## 2023-02-16 NOTE — PROGRESS NOTES
Chief Complaint  Chief Complaint   Patient presents with    Follow-up     With labs       History of Present Illness  Patient presents to clinic for routine follow-up with lab review and acute concerns.  Blood work performed prior to this visit includes CBC with no concerning findings and CMP with elevated creatinine at 1.21 with EGFR at 45.  She does admit to poor hydration.  FLP performed with LDL of 130 H with TSH and vitamin-D both within normal limits.  She has a complaint at this time of significant itching isolated to her left upper inner arm which happened 1 month ago and presented to an urgent care where she was treated with corticosteroid injection, 5 days of oral prednisone, and 1 week of famotidine and cetirizine and doxycycline.  She states that the day after her symptoms resolved until yesterday when identical symptoms occurred again in the same spot and has been itching the area so much that she has a significant red rash encompassing most of her left inner upper arm.  She denies any new exposures to irritants or detergents or soaps or perfumes and has not had a reaction like this other than 1 month ago in the past.    Unrelated the patient also reports that she followed up with her eye specialist and was having complaint of intermittent diplopia and was told that this did not seem to be an ocular problem but did suggest a follow-up with a vascular specialist to see if there was a vascular source of her diplopia which is not causing active complaints at this time.  She denies dizziness or persistent vision changes.    PMH:  Urinary incontinence-status post surgical correction x3 with inadequate improvement with oxybutynin and Myrbetriq and Estrace   Hypothyroidism-Synthroid 75 mcg daily  CVA history-81 mg aspirin daily   Hyperlipidemia-pravastatin   Depression-Lexapro 5 mg daily    Review of Systems  Review of Systems   Constitutional:  Negative for chills and fever.   HENT:  Negative for  congestion and sore throat.    Eyes:  Negative for redness and visual disturbance.   Respiratory:  Negative for cough and shortness of breath.    Cardiovascular:  Negative for chest pain and palpitations.   Gastrointestinal:  Negative for nausea and vomiting.   Musculoskeletal:  Negative for arthralgias and myalgias.        Left wrist pain without swelling   Skin:  Positive for color change and rash. Negative for pallor.   Neurological:  Negative for dizziness and headaches.   Psychiatric/Behavioral:  Negative for agitation and dysphoric mood.      Physical Exam  Physical Exam  Constitutional:       Appearance: Normal appearance.   HENT:      Head: Normocephalic and atraumatic.   Eyes:      General: No scleral icterus.     Conjunctiva/sclera: Conjunctivae normal.   Cardiovascular:      Rate and Rhythm: Normal rate and regular rhythm.   Pulmonary:      Effort: Pulmonary effort is normal.      Breath sounds: Normal breath sounds.   Skin:     General: Skin is warm and dry.      Comments: Significant erythema with post ex short of change in noted to in her left upper arm without signs concerning for cellulitis.  Examine this reaction noted. no Axillary lymphadenopathy   Neurological:      General: No focal deficit present.      Mental Status: She is alert and oriented to person, place, and time.   Psychiatric:         Mood and Affect: Mood normal.         Behavior: Behavior normal.       Problem List/Past Medical History  Past Medical History:   Diagnosis Date    Anticoagulant long-term use     asa 81mg    High cholesterol     Stroke     TIA many years ago // no deficeit    Thyroid disease        Procedure/Surgical History  Past Surgical History:   Procedure Laterality Date    DILATION AND CURETTAGE OF UTERUS      GALLBLADDER SURGERY      HYSTERECTOMY      REPAIR OF RECTOCELE      REVISION OF PUBOVAGINAL SLING N/A 5/4/2021    Procedure: REVISION, PUBOVAGINAL SLING;  Surgeon: Evin Miguel MD;  Location: [facility] OR  2ND FLR;  Service: Urology;  Laterality: N/A;  1hr    TONSILLECTOMY         Medications  Current Outpatient Medications on File Prior to Visit   Medication Sig Dispense Refill    aspirin (ECOTRIN) 81 MG EC tablet Take 81 mg by mouth once daily.      ciprofloxacin HCl (CIPRO) 500 MG tablet Take 1 tablet (500 mg total) by mouth 2 (two) times daily. 10 tablet 0    EScitalopram oxalate (LEXAPRO) 10 MG tablet Take 1 tablet (10 mg total) by mouth once daily. 90 tablet 3    estradioL (ESTRACE) 0.01 % (0.1 mg/gram) vaginal cream Place 1 g vaginally.      levothyroxine (SYNTHROID) 50 MCG tablet Take 1 tablet (50 mcg total) by mouth before breakfast. 90 tablet 3    levothyroxine (SYNTHROID) 75 MCG tablet Take 1 tablet (75 mcg total) by mouth before breakfast. 90 tablet 3    omeprazole (PRILOSEC) 20 MG capsule Take 1 capsule (20 mg total) by mouth once daily. 90 capsule 3    pravastatin (PRAVACHOL) 20 MG tablet Take 1 tablet (20 mg total) by mouth once daily. 90 tablet 3    vitamin D (VITAMIN D3) 1000 units Tab Take 1,000 Units by mouth once daily.      solifenacin (VESICARE) 10 MG tablet Take 1 tablet (10 mg total) by mouth once daily. 30 tablet 11     No current facility-administered medications on file prior to visit.       Allegies  Review of patient's allergies indicates:   Allergen Reactions    Cephalexin Other (See Comments)    Amoxicillin-pot clavulanate Nausea And Vomiting    Codeine Nausea And Vomiting        Social History  Social History     Socioeconomic History    Marital status:    Tobacco Use    Smoking status: Never    Smokeless tobacco: Never   Substance and Sexual Activity    Alcohol use: Not Currently    Drug use: Never    Sexual activity: Not Currently       Family History  Family History   Family history unknown: Yes         Immunizations    There is no immunization history on file for this patient.    Health Maintenance  Health Maintenance   Topic Date Due    TETANUS VACCINE  Never done    TIFFANIE  Scan  Never done    Lipid Panel  02/09/2028        Assessment / Plan  Problem List Items Addressed This Visit          Neuro    Vascular dementia without behavioral disturbance       Renal/    Stage 3a chronic kidney disease    Overview     Stable renal indices.  No significant proteinuria.  Continue risk factor management and periodic monitoring with renal sparing activities including:  -Follow low sodium diet (2 grams a day)   -Control high blood pressure ( goal BP < 130/80, please record BP at home every day and bring log to next office visit to assure that home cuff is calibrated at minimum every 12 months)   -Exercise at least 30 minutes a day, 5 days a week.   -Maintain healthy weight.   -Stay well hydrated   -Receive Pneumovax, Flu, and HBV vaccines if indicated.   -Do not take NSAIDs (Ibuprofen, Naproxen, Aleve, Advil, Toradol, Mobic), may take only Tylenol as needed for pain/headaches.   -Take cholesterol-lowering medications as prescribed (LDL goal <100)            Other    Allergic reaction    Overview     Sensitive skin protocol: Includes avoiding hot showers or baths, using only room temperature or lukewarm baths at the warmest and pat-to-dry instead of scrubbing to dry to avoid further skin irritation.  Avoid scratching or itching affected areas.  Avoid use of skin detergents, deodorants, or perfumes that are heavily fragmented and may exacerbate skin symptoms.  Avoid excessive sun exposure and if sun exposure for over 15 minutes is unavoidable recommend topical sun lotion with protection of SPF 30 or greater.  Avoid excessive cleaning or use of soaps that may dry out or further irritated skin; use only mild soaps with baths at maximum of once per day; soap such as Dove or Olay in bar form that are unscented are most recommended with detergent such as free and clear or other sensitive skin detergents.  Recommend over-the-counter emollients such as Cetaphil or CeraVe for moisturizing or Vaseline to  create a ointment barrier and use of over-the-counter or prescription antihistamine such as cetirizine or loratadine daily or diphenhydramine nightly if itching is making sleep difficult.  Use over-the-counter or prescribed steroid cream as symptoms persist without use for over 2 weeks at a time as to avoid skin atrophy.  If no significant improvement noted over the next 48-72 hours or no resolution after 1-2 weeks recommend reevaluation in clinic.    Triamcinolone cream prescribed now will attempt 1 month of dual antihistamine therapy with corticosteroid injection today and 6 day Medrol Dosepak taper starting tomorrow.  Stressed the patient's T of most importance that she avoid scratching the area due to concern for infection if this occurs in contact clinic if persistent symptoms are noted.          Other Visit Diagnoses       Transient diplopia    -  Primary            RTC routine follow-up    Gustavo Butler

## 2023-02-23 DIAGNOSIS — K21.9 GASTROESOPHAGEAL REFLUX DISEASE, UNSPECIFIED WHETHER ESOPHAGITIS PRESENT: ICD-10-CM

## 2023-02-23 DIAGNOSIS — F41.9 ANXIETY: ICD-10-CM

## 2023-02-23 DIAGNOSIS — E03.9 HYPOTHYROIDISM, UNSPECIFIED TYPE: Primary | ICD-10-CM

## 2023-02-23 DIAGNOSIS — E78.5 HYPERLIPIDEMIA, UNSPECIFIED: ICD-10-CM

## 2023-02-23 RX ORDER — PRAVASTATIN SODIUM 20 MG/1
20 TABLET ORAL DAILY
Qty: 90 TABLET | Refills: 3 | Status: SHIPPED | OUTPATIENT
Start: 2023-02-23 | End: 2024-03-12 | Stop reason: SDUPTHER

## 2023-02-23 RX ORDER — LEVOTHYROXINE SODIUM 75 UG/1
75 TABLET ORAL
Qty: 90 TABLET | Refills: 3 | Status: SHIPPED | OUTPATIENT
Start: 2023-02-23 | End: 2024-03-12 | Stop reason: SDUPTHER

## 2023-02-23 RX ORDER — OMEPRAZOLE 20 MG/1
20 CAPSULE, DELAYED RELEASE ORAL DAILY
Qty: 90 CAPSULE | Refills: 3 | Status: SHIPPED | OUTPATIENT
Start: 2023-02-23 | End: 2024-03-12 | Stop reason: SDUPTHER

## 2023-02-23 RX ORDER — ESCITALOPRAM OXALATE 10 MG/1
10 TABLET ORAL DAILY
Qty: 90 TABLET | Refills: 3 | Status: SHIPPED | OUTPATIENT
Start: 2023-02-23

## 2023-05-31 NOTE — PROGRESS NOTES
Date: 06/01/2023  Patient ID: [id number]   Chief Complaint: swollen glands (Right side lump on neck, had thyroid surgery in the past, noticed it about a month ago, states has been choking a lot, not painful )      HPI:     Trever Del Toro is a 83 y.o. female here today for swollen glands (Right side lump on neck, had thyroid surgery in the past, noticed it about a month ago, states has been choking a lot, not painful )  Patient's friend noticed bump on right side of neck, so patient wanted it to get checked out. She denies pain and enlarging size since noticed swelling. No difficulty swallowing but chokes intermittently without food. Never chokes with food. Denies vomiting, hematemesis, sore throat, runny nose, fevers, large weight fluctuations.      Past Medical History:   Diagnosis Date    Anticoagulant long-term use     asa 81mg    High cholesterol     Stroke     TIA many years ago // no deficeit    Thyroid disease        Past Surgical History:   Procedure Laterality Date    DILATION AND CURETTAGE OF UTERUS      GALLBLADDER SURGERY      HYSTERECTOMY      left thyroid surgery Left     excision    REPAIR OF RECTOCELE      REVISION OF PUBOVAGINAL SLING N/A 05/04/2021    Procedure: REVISION, PUBOVAGINAL SLING;  Surgeon: Evin Miguel MD;  Location: [facility] OR [address] [place] [address];  Service: Urology;  Laterality: N/A;  1hr    TONSILLECTOMY         Review of patient's allergies indicates:   Allergen Reactions    Cephalexin Other (See Comments)    Amoxicillin-pot clavulanate Nausea And Vomiting    Codeine Nausea And Vomiting       Outpatient Medications Marked as Taking for the 6/1/23 encounter (Office Visit) with Rose Trujillo MD   Medication Sig Dispense Refill    aspirin (ECOTRIN) 81 MG EC tablet Take 81 mg by mouth once daily.      cetirizine (ZYRTEC) 10 MG tablet TAKE 1 TABLET BY MOUTH EVERY DAY 30 tablet 0    ciprofloxacin HCl (CIPRO) 500 MG tablet Take 1 tablet (500 mg total) by mouth 2 (two) times daily. 10 tablet 0     "EScitalopram oxalate (LEXAPRO) 10 MG tablet Take 1 tablet (10 mg total) by mouth once daily. 90 tablet 3    estradioL (ESTRACE) 0.01 % (0.1 mg/gram) vaginal cream Place 1 g vaginally.      famotidine (PEPCID) 20 MG tablet Take 1 tablet (20 mg total) by mouth once daily. 30 tablet 0    levothyroxine (SYNTHROID) 50 MCG tablet Take 1 tablet (50 mcg total) by mouth before breakfast. 90 tablet 3    levothyroxine (SYNTHROID) 75 MCG tablet Take 1 tablet (75 mcg total) by mouth before breakfast. 90 tablet 3    methylPREDNISolone (MEDROL DOSEPACK) 4 mg tablet use as directed Start on 2/17/2023 21 each 0    omeprazole (PRILOSEC) 20 MG capsule Take 1 capsule (20 mg total) by mouth once daily. 90 capsule 3    pravastatin (PRAVACHOL) 20 MG tablet Take 1 tablet (20 mg total) by mouth once daily. 90 tablet 3    triamcinolone acetonide 0.1% (KENALOG) 0.1 % cream Apply topically 2 (two) times daily. 2 weeks maximum 45 g 0    vitamin D (VITAMIN D3) 1000 units Tab Take 1,000 Units by mouth once daily.         Family History   Family history unknown: Yes        Social History     Socioeconomic History    Marital status:    Tobacco Use    Smoking status: Never    Smokeless tobacco: Never   Substance and Sexual Activity    Alcohol use: Not Currently    Drug use: Never    Sexual activity: Not Currently       Patient Care Team:  Rose Trujillo MD as PCP - General (Family Medicine)  Gustavo Butler MD     Subjective:     Review of Systems   Respiratory:  Negative for shortness of breath.    Gastrointestinal:  Negative for abdominal pain, blood in stool, heartburn, melena, nausea and vomiting.   See HPI for details  All Other ROS: Negative except as stated in HPI.     Objective:     /68   Pulse 95   Ht 5' 2" (1.575 m)   Wt 59.2 kg (130 lb 9.6 oz)   SpO2 96%   BMI 23.89 kg/m²     Physical Exam  HENT:      Right Ear: Tympanic membrane, ear canal and external ear normal.      Left Ear: Tympanic membrane, ear canal and " external ear normal.      Nose: Nose normal. No congestion or rhinorrhea.      Mouth/Throat:      Mouth: Mucous membranes are moist.      Pharynx: Oropharynx is clear. No oropharyngeal exudate or posterior oropharyngeal erythema.      Comments: Mallampati 2-3  Neck:      Comments: Right thyroid enlarged. Left thyroid nonpalpable. 2+ BL submandibular lymphadenopathy  Cardiovascular:      Rate and Rhythm: Normal rate and regular rhythm.      Heart sounds: Normal heart sounds.   Pulmonary:      Effort: Pulmonary effort is normal.   Musculoskeletal:      Cervical back: Neck supple. No tenderness.   Neurological:      Mental Status: She is oriented to person, place, and time.   Psychiatric:         Mood and Affect: Mood normal.         Behavior: Behavior normal.         Thought Content: Thought content normal.         Judgment: Judgment normal.       Assessment:       ICD-10-CM ICD-9-CM   1. Dysphagia, unspecified type  R13.10 787.20   2. Thyroid mass of unclear etiology  E07.89 239.7        Plan:     1. Dysphagia, unspecified type  Assessment & Plan:  Dysphagia diet to avoid aspiration  Will obtain swallow study    Orders:  -     US Soft Tissue Head Neck Thyroid; Future; Expected date: 06/01/2023  -     SLP video swallow; Future; Expected date: 06/01/2023    2. Thyroid mass of unclear etiology  Assessment & Plan:  Previous TFTs wnl  Obtain thyroid/neck US           Medication List with Changes/Refills   Current Medications    ASPIRIN (ECOTRIN) 81 MG EC TABLET    Take 81 mg by mouth once daily.       Start Date: --        End Date: --    CETIRIZINE (ZYRTEC) 10 MG TABLET    TAKE 1 TABLET BY MOUTH EVERY DAY       Start Date: 3/10/2023 End Date: --    CIPROFLOXACIN HCL (CIPRO) 500 MG TABLET    Take 1 tablet (500 mg total) by mouth 2 (two) times daily.       Start Date: 6/28/2022 End Date: --    ESCITALOPRAM OXALATE (LEXAPRO) 10 MG TABLET    Take 1 tablet (10 mg total) by mouth once daily.       Start Date: 2/23/2023 End  Date: --    ESTRADIOL (ESTRACE) 0.01 % (0.1 MG/GRAM) VAGINAL CREAM    Place 1 g vaginally.       Start Date: 4/18/2022 End Date: --    FAMOTIDINE (PEPCID) 20 MG TABLET    Take 1 tablet (20 mg total) by mouth once daily.       Start Date: 2/16/2023 End Date: 2/16/2024    LEVOTHYROXINE (SYNTHROID) 50 MCG TABLET    Take 1 tablet (50 mcg total) by mouth before breakfast.       Start Date: 9/12/2022 End Date: --    LEVOTHYROXINE (SYNTHROID) 75 MCG TABLET    Take 1 tablet (75 mcg total) by mouth before breakfast.       Start Date: 2/23/2023 End Date: --    METHYLPREDNISOLONE (MEDROL DOSEPACK) 4 MG TABLET    use as directed Start on 2/17/2023       Start Date: 2/16/2023 End Date: --    OMEPRAZOLE (PRILOSEC) 20 MG CAPSULE    Take 1 capsule (20 mg total) by mouth once daily.       Start Date: 2/23/2023 End Date: --    PRAVASTATIN (PRAVACHOL) 20 MG TABLET    Take 1 tablet (20 mg total) by mouth once daily.       Start Date: 2/23/2023 End Date: --    SOLIFENACIN (VESICARE) 10 MG TABLET    Take 1 tablet (10 mg total) by mouth once daily.       Start Date: 5/31/2021 End Date: 5/31/2022    TRIAMCINOLONE ACETONIDE 0.1% (KENALOG) 0.1 % CREAM    Apply topically 2 (two) times daily. 2 weeks maximum       Start Date: 2/16/2023 End Date: --    VITAMIN D (VITAMIN D3) 1000 UNITS TAB    Take 1,000 Units by mouth once daily.       Start Date: --        End Date: --          Follow up in about 4 weeks (around 6/29/2023) for dysphagia, Lab results. In addition to their scheduled follow up, the patient has also been instructed to follow up on as needed basis.

## 2023-06-01 ENCOUNTER — OFFICE VISIT (OUTPATIENT)
Dept: PRIMARY CARE CLINIC | Facility: CLINIC | Age: 83
End: 2023-06-01
Payer: MEDICARE

## 2023-06-01 VITALS
BODY MASS INDEX: 24.04 KG/M2 | DIASTOLIC BLOOD PRESSURE: 68 MMHG | HEART RATE: 95 BPM | OXYGEN SATURATION: 96 % | HEIGHT: 62 IN | WEIGHT: 130.63 LBS | SYSTOLIC BLOOD PRESSURE: 135 MMHG

## 2023-06-01 DIAGNOSIS — E07.89 THYROID MASS OF UNCLEAR ETIOLOGY: ICD-10-CM

## 2023-06-01 DIAGNOSIS — R13.10 DYSPHAGIA, UNSPECIFIED TYPE: Primary | ICD-10-CM

## 2023-06-01 PROCEDURE — 99214 PR OFFICE/OUTPT VISIT, EST, LEVL IV, 30-39 MIN: ICD-10-PCS | Mod: ,,, | Performed by: STUDENT IN AN ORGANIZED HEALTH CARE EDUCATION/TRAINING PROGRAM

## 2023-06-01 PROCEDURE — 99214 OFFICE O/P EST MOD 30 MIN: CPT | Mod: ,,, | Performed by: STUDENT IN AN ORGANIZED HEALTH CARE EDUCATION/TRAINING PROGRAM

## 2023-06-05 ENCOUNTER — TELEPHONE (OUTPATIENT)
Dept: PRIMARY CARE CLINIC | Facility: CLINIC | Age: 83
End: 2023-06-05
Payer: MEDICARE

## 2023-06-05 DIAGNOSIS — E07.89 THYROID MASS OF UNCLEAR ETIOLOGY: Primary | ICD-10-CM

## 2023-06-05 DIAGNOSIS — E04.1 RIGHT THYROID NODULE: ICD-10-CM

## 2023-09-06 ENCOUNTER — TELEPHONE (OUTPATIENT)
Dept: PRIMARY CARE CLINIC | Facility: CLINIC | Age: 83
End: 2023-09-06
Payer: MEDICARE

## 2023-09-06 DIAGNOSIS — N30.00 ACUTE CYSTITIS WITHOUT HEMATURIA: Primary | ICD-10-CM

## 2023-09-06 DIAGNOSIS — N39.41 URGENCY INCONTINENCE: Primary | ICD-10-CM

## 2023-09-06 RX ORDER — NITROFURANTOIN 25; 75 MG/1; MG/1
100 CAPSULE ORAL 2 TIMES DAILY
Qty: 10 CAPSULE | Refills: 0 | Status: SHIPPED | OUTPATIENT
Start: 2023-09-06 | End: 2023-09-11

## 2023-09-06 NOTE — TELEPHONE ENCOUNTER
----- Message from Shilpi Valle sent at 9/5/2023 12:07 PM CDT -----  Regarding: Urinalysis  .Type:  Patient Returning Call    Who Called:Jackie  Who Left Message for Patient:Jackie  Does the patient know what this is regarding?:UTI  Would the patient rather a call back or a response via [facility]?   Best Call Back Number:[phone]  Additional Information: Patient would like tot come in for a urinalysis. Possible UTI. Does not want me to make an appointment.

## 2024-03-12 ENCOUNTER — TELEPHONE (OUTPATIENT)
Dept: PRIMARY CARE CLINIC | Facility: CLINIC | Age: 84
End: 2024-03-12
Payer: MEDICARE

## 2024-03-12 DIAGNOSIS — K21.9 GASTROESOPHAGEAL REFLUX DISEASE, UNSPECIFIED WHETHER ESOPHAGITIS PRESENT: ICD-10-CM

## 2024-03-12 DIAGNOSIS — E78.5 HYPERLIPIDEMIA, UNSPECIFIED: ICD-10-CM

## 2024-03-12 DIAGNOSIS — E03.9 HYPOTHYROIDISM, UNSPECIFIED TYPE: ICD-10-CM

## 2024-03-12 RX ORDER — OMEPRAZOLE 20 MG/1
20 CAPSULE, DELAYED RELEASE ORAL DAILY
Qty: 90 CAPSULE | Refills: 3 | Status: SHIPPED | OUTPATIENT
Start: 2024-03-12 | End: 2024-03-12 | Stop reason: SDUPTHER

## 2024-03-12 RX ORDER — OMEPRAZOLE 20 MG/1
20 CAPSULE, DELAYED RELEASE ORAL DAILY
Qty: 90 CAPSULE | Refills: 3 | Status: SHIPPED | OUTPATIENT
Start: 2024-03-12

## 2024-03-12 RX ORDER — PRAVASTATIN SODIUM 20 MG/1
20 TABLET ORAL DAILY
Qty: 90 TABLET | Refills: 3 | Status: SHIPPED | OUTPATIENT
Start: 2024-03-12

## 2024-03-12 RX ORDER — PRAVASTATIN SODIUM 20 MG/1
20 TABLET ORAL DAILY
Qty: 90 TABLET | Refills: 3 | Status: SHIPPED | OUTPATIENT
Start: 2024-03-12 | End: 2024-03-12 | Stop reason: SDUPTHER

## 2024-03-12 RX ORDER — LEVOTHYROXINE SODIUM 75 UG/1
75 TABLET ORAL
Qty: 90 TABLET | Refills: 3 | Status: SHIPPED | OUTPATIENT
Start: 2024-03-12

## 2024-03-12 RX ORDER — LEVOTHYROXINE SODIUM 75 UG/1
75 TABLET ORAL
Qty: 90 TABLET | Refills: 3 | Status: SHIPPED | OUTPATIENT
Start: 2024-03-12 | End: 2024-03-12 | Stop reason: SDUPTHER

## 2024-03-12 NOTE — TELEPHONE ENCOUNTER
----- Message from Liz Chin sent at 3/12/2024 10:39 AM CDT -----  Regarding: Med refill  Pt came in office requesting a refill on Pravastatin Sodium 20mg tab,Levothyroxine Sodium 75mg, and Omeprazole 20 mg cap. Send to [facility].    Informed pt she is needing a follow up appt due to her LOV:6/1/2023. She stated she only has 1 pill left for each med. Please advise   FOV:3/26/2024

## 2024-12-25 ENCOUNTER — HOSPITAL ENCOUNTER (INPATIENT)
Facility: HOSPITAL | Age: 84
LOS: 5 days | Discharge: NURSING, REHAB OR LONG-TERM CARE | DRG: 481 | End: 2024-12-30
Attending: EMERGENCY MEDICINE | Admitting: STUDENT IN AN ORGANIZED HEALTH CARE EDUCATION/TRAINING PROGRAM
Payer: MEDICARE

## 2024-12-25 DIAGNOSIS — Z01.818 PRE-OP EVALUATION: ICD-10-CM
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
## 2024-12-25 DIAGNOSIS — Z01.818 PREOPERATIVE CLEARANCE: ICD-10-CM

## 2024-12-25 DIAGNOSIS — S72.001A CLOSED FRACTURE OF NECK OF RIGHT FEMUR, INITIAL ENCOUNTER: Primary | ICD-10-CM

## 2024-12-25 DIAGNOSIS — R07.9 CHEST PAIN: ICD-10-CM

## 2024-12-25 DIAGNOSIS — S09.90XA MINOR HEAD INJURY, INITIAL ENCOUNTER: ICD-10-CM

## 2024-12-25 LAB
ABORH RETYPE: NORMAL
ALBUMIN SERPL-MCNC: 3.7 G/DL (ref 3.4–4.8)
ALBUMIN/GLOB SERPL: 1.5 RATIO (ref 1.1–2)
ALP SERPL-CCNC: 56 UNIT/L (ref 40–150)
ALT SERPL-CCNC: 12 UNIT/L (ref 0–55)
ANION GAP SERPL CALC-SCNC: 9 MEQ/L
APTT PPP: 21.6 SECONDS (ref 23.2–33.7)
AST SERPL-CCNC: 26 UNIT/L (ref 5–34)
BASOPHILS # BLD AUTO: 0.03 X10(3)/MCL
BASOPHILS NFR BLD AUTO: 0.4 %
BILIRUB SERPL-MCNC: 0.5 MG/DL
BUN SERPL-MCNC: 23.2 MG/DL (ref 9.8–20.1)
CALCIUM SERPL-MCNC: 8.8 MG/DL (ref 8.4–10.2)
CHLORIDE SERPL-SCNC: 105 MMOL/L (ref 98–107)
CO2 SERPL-SCNC: 27 MMOL/L (ref 23–31)
CREAT SERPL-MCNC: 1.55 MG/DL (ref 0.55–1.02)
CREAT/UREA NIT SERPL: 15
EOSINOPHIL # BLD AUTO: 0.21 X10(3)/MCL (ref 0–0.9)
EOSINOPHIL NFR BLD AUTO: 2.9 %
ERYTHROCYTE [DISTWIDTH] IN BLOOD BY AUTOMATED COUNT: 12.1 % (ref 11.5–17)
GFR SERPLBLD CREATININE-BSD FMLA CKD-EPI: 33 ML/MIN/1.73/M2
GLOBULIN SER-MCNC: 2.5 GM/DL (ref 2.4–3.5)
GLUCOSE SERPL-MCNC: 108 MG/DL (ref 82–115)
GROUP & RH: NORMAL
HCT VFR BLD AUTO: 33 % (ref 37–47)
HGB BLD-MCNC: 11 G/DL (ref 12–16)
IMM GRANULOCYTES # BLD AUTO: 0.03 X10(3)/MCL (ref 0–0.04)
IMM GRANULOCYTES NFR BLD AUTO: 0.4 %
INDIRECT COOMBS: NORMAL
INR PPP: 1.1
LYMPHOCYTES # BLD AUTO: 1.2 X10(3)/MCL (ref 0.6–4.6)
LYMPHOCYTES NFR BLD AUTO: 16.5 %
MCH RBC QN AUTO: 29.4 PG (ref 27–31)
MCHC RBC AUTO-ENTMCNC: 33.3 G/DL (ref 33–36)
MCV RBC AUTO: 88.2 FL (ref 80–94)
MONOCYTES # BLD AUTO: 0.66 X10(3)/MCL (ref 0.1–1.3)
MONOCYTES NFR BLD AUTO: 9.1 %
NEUTROPHILS # BLD AUTO: 5.13 X10(3)/MCL (ref 2.1–9.2)
NEUTROPHILS NFR BLD AUTO: 70.7 %
NRBC BLD AUTO-RTO: 0 %
PLATELET # BLD AUTO: 212 X10(3)/MCL (ref 130–400)
PMV BLD AUTO: 9.3 FL (ref 7.4–10.4)
POTASSIUM SERPL-SCNC: 4.4 MMOL/L (ref 3.5–5.1)
PROT SERPL-MCNC: 6.2 GM/DL (ref 5.8–7.6)
PROTHROMBIN TIME: 14.2 SECONDS (ref 12.5–14.5)
RBC # BLD AUTO: 3.74 X10(6)/MCL (ref 4.2–5.4)
SODIUM SERPL-SCNC: 141 MMOL/L (ref 136–145)
SPECIMEN OUTDATE: NORMAL
WBC # BLD AUTO: 7.26 X10(3)/MCL (ref 4.5–11.5)

## 2024-12-25 PROCEDURE — 11000001 HC ACUTE MED/SURG PRIVATE ROOM

## 2024-12-25 PROCEDURE — 93005 ELECTROCARDIOGRAM TRACING: CPT

## 2024-12-25 PROCEDURE — 80053 COMPREHEN METABOLIC PANEL: CPT | Performed by: EMERGENCY MEDICINE

## 2024-12-25 PROCEDURE — 25000003 PHARM REV CODE 250: Performed by: NURSE PRACTITIONER

## 2024-12-25 PROCEDURE — 85610 PROTHROMBIN TIME: CPT | Performed by: EMERGENCY MEDICINE

## 2024-12-25 PROCEDURE — 99285 EMERGENCY DEPT VISIT HI MDM: CPT | Mod: 25

## 2024-12-25 PROCEDURE — 85730 THROMBOPLASTIN TIME PARTIAL: CPT | Performed by: EMERGENCY MEDICINE

## 2024-12-25 PROCEDURE — 85025 COMPLETE CBC W/AUTO DIFF WBC: CPT | Performed by: EMERGENCY MEDICINE

## 2024-12-25 PROCEDURE — 93010 ELECTROCARDIOGRAM REPORT: CPT | Mod: ,,, | Performed by: INTERNAL MEDICINE

## 2024-12-25 PROCEDURE — 86900 BLOOD TYPING SEROLOGIC ABO: CPT | Performed by: ORTHOPAEDIC SURGERY

## 2024-12-25 PROCEDURE — 63600175 PHARM REV CODE 636 W HCPCS: Performed by: NURSE PRACTITIONER

## 2024-12-25 RX ORDER — BISACODYL 10 MG/1
10 SUPPOSITORY RECTAL DAILY PRN
Status: DISCONTINUED | OUTPATIENT
Start: 2024-12-25 | End: 2024-12-30 | Stop reason: HOSPADM

## 2024-12-25 RX ORDER — ACETAMINOPHEN 500 MG
1000 TABLET ORAL EVERY 6 HOURS PRN
Status: DISCONTINUED | OUTPATIENT
Start: 2024-12-25 | End: 2024-12-30 | Stop reason: HOSPADM

## 2024-12-25 RX ORDER — SODIUM CHLORIDE 0.9 % (FLUSH) 0.9 %
10 SYRINGE (ML) INJECTION
Status: DISCONTINUED | OUTPATIENT
Start: 2024-12-25 | End: 2024-12-30 | Stop reason: HOSPADM

## 2024-12-25 RX ORDER — TALC
6 POWDER (GRAM) TOPICAL NIGHTLY PRN
Status: DISCONTINUED | OUTPATIENT
Start: 2024-12-25 | End: 2024-12-30 | Stop reason: HOSPADM

## 2024-12-25 RX ORDER — MORPHINE SULFATE 4 MG/ML
4 INJECTION, SOLUTION INTRAMUSCULAR; INTRAVENOUS ONCE AS NEEDED
Status: DISCONTINUED | OUTPATIENT
Start: 2024-12-25 | End: 2024-12-30 | Stop reason: HOSPADM

## 2024-12-25 RX ORDER — ALUMINUM HYDROXIDE, MAGNESIUM HYDROXIDE, AND SIMETHICONE 1200; 120; 1200 MG/30ML; MG/30ML; MG/30ML
30 SUSPENSION ORAL 4 TIMES DAILY PRN
Status: DISCONTINUED | OUTPATIENT
Start: 2024-12-25 | End: 2024-12-30 | Stop reason: HOSPADM

## 2024-12-25 RX ORDER — ONDANSETRON HYDROCHLORIDE 2 MG/ML
4 INJECTION, SOLUTION INTRAVENOUS ONCE AS NEEDED
Status: DISCONTINUED | OUTPATIENT
Start: 2024-12-25 | End: 2024-12-30 | Stop reason: HOSPADM

## 2024-12-25 RX ORDER — NALOXONE HCL 0.4 MG/ML
0.02 VIAL (ML) INJECTION
Status: DISCONTINUED | OUTPATIENT
Start: 2024-12-25 | End: 2024-12-30 | Stop reason: HOSPADM

## 2024-12-25 RX ORDER — PRAVASTATIN SODIUM 10 MG/1
20 TABLET ORAL DAILY
Status: DISCONTINUED | OUTPATIENT
Start: 2024-12-27 | End: 2024-12-29

## 2024-12-25 RX ORDER — AMOXICILLIN 250 MG
1 CAPSULE ORAL 2 TIMES DAILY PRN
Status: DISCONTINUED | OUTPATIENT
Start: 2024-12-25 | End: 2024-12-30 | Stop reason: HOSPADM

## 2024-12-25 RX ORDER — ESCITALOPRAM OXALATE 10 MG/1
10 TABLET ORAL DAILY
Status: DISCONTINUED | OUTPATIENT
Start: 2024-12-27 | End: 2024-12-30 | Stop reason: HOSPADM

## 2024-12-25 RX ORDER — PROCHLORPERAZINE EDISYLATE 5 MG/ML
5 INJECTION INTRAMUSCULAR; INTRAVENOUS EVERY 6 HOURS PRN
Status: DISCONTINUED | OUTPATIENT
Start: 2024-12-25 | End: 2024-12-30 | Stop reason: HOSPADM

## 2024-12-25 RX ORDER — ONDANSETRON HYDROCHLORIDE 2 MG/ML
4 INJECTION, SOLUTION INTRAVENOUS EVERY 4 HOURS PRN
Status: DISCONTINUED | OUTPATIENT
Start: 2024-12-25 | End: 2024-12-30 | Stop reason: HOSPADM

## 2024-12-25 RX ORDER — SODIUM CHLORIDE, SODIUM LACTATE, POTASSIUM CHLORIDE, CALCIUM CHLORIDE 600; 310; 30; 20 MG/100ML; MG/100ML; MG/100ML; MG/100ML
INJECTION, SOLUTION INTRAVENOUS CONTINUOUS
Status: DISCONTINUED | OUTPATIENT
Start: 2024-12-25 | End: 2024-12-28

## 2024-12-25 RX ORDER — IBUPROFEN 200 MG
24 TABLET ORAL
Status: DISCONTINUED | OUTPATIENT
Start: 2024-12-25 | End: 2024-12-30 | Stop reason: HOSPADM

## 2024-12-25 RX ORDER — METHOCARBAMOL 500 MG/1
500 TABLET, FILM COATED ORAL 3 TIMES DAILY
Status: DISCONTINUED | OUTPATIENT
Start: 2024-12-25 | End: 2024-12-30 | Stop reason: HOSPADM

## 2024-12-25 RX ORDER — GLUCAGON 1 MG
1 KIT INJECTION
Status: DISCONTINUED | OUTPATIENT
Start: 2024-12-25 | End: 2024-12-30 | Stop reason: HOSPADM

## 2024-12-25 RX ORDER — IBUPROFEN 200 MG
16 TABLET ORAL
Status: DISCONTINUED | OUTPATIENT
Start: 2024-12-25 | End: 2024-12-30 | Stop reason: HOSPADM

## 2024-12-25 RX ORDER — LEVOTHYROXINE SODIUM 50 UG/1
50 TABLET ORAL
Status: DISCONTINUED | OUTPATIENT
Start: 2024-12-27 | End: 2024-12-30 | Stop reason: HOSPADM

## 2024-12-25 RX ADMIN — SODIUM CHLORIDE, SODIUM LACTATE, POTASSIUM CHLORIDE, CALCIUM CHLORIDE: 20; 30; 600; 310 INJECTION, SOLUTION INTRAVENOUS at 08:12

## 2024-12-25 RX ADMIN — METHOCARBAMOL 500 MG: 500 TABLET ORAL at 10:12

## 2024-12-25 RX ADMIN — ACETAMINOPHEN 1000 MG: 500 TABLET ORAL at 10:12

## 2024-12-25 NOTE — ED PROVIDER NOTES
Encounter Date: 12/25/2024       History     Chief Complaint   Patient presents with    Fall     Trip and fall after bumping into sister while in kitchen. Denies LOC, BT. Denies any pain. Hit head on doorknob.      84-year-old female presents to emergency department complaining of right hip pain, mild head pain after trip and fall at home after bumping into a family member.  She denies loss of consciousness.  Is on 81 mg aspirin daily but otherwise no anticoagulant use.  States right hip/groin pain with movement; however, no pain if sitting still.  Unable to bear weight without assistance.  Denies any neck or back pain.  No chest pain or shortness of breath.  No abdominal pain.    Tetanus immunization up-to-date 05/20/2024    The history is provided by the patient and the EMS personnel.     Review of patient's allergies indicates:   Allergen Reactions    Cephalexin Other (See Comments)    Amoxicillin-pot clavulanate Nausea And Vomiting    Codeine Nausea And Vomiting     Past Medical History:   Diagnosis Date    Anticoagulant long-term use     asa 81mg    High cholesterol     Stroke     TIA many years ago // no deficeit    Thyroid disease      Past Surgical History:   Procedure Laterality Date    DILATION AND CURETTAGE OF UTERUS      GALLBLADDER SURGERY      HYSTERECTOMY      REPAIR OF RECTOCELE      REVISION OF PUBOVAGINAL SLING N/A 05/04/2021    Procedure: REVISION, PUBOVAGINAL SLING;  Surgeon: Evin Miguel MD;  Location: [facility] OR [address] [place] [address];  Service: Urology;  Laterality: N/A;  1hr    THYROID SURGERY Right     TONSILLECTOMY       Family History   Family history unknown: Yes     Social History     Tobacco Use    Smoking status: Never    Smokeless tobacco: Never   Substance Use Topics    Alcohol use: Not Currently    Drug use: Never     Review of Systems   Constitutional:  Negative for fever.   Respiratory:  Negative for shortness of breath.    Gastrointestinal:  Negative for abdominal pain, nausea and  vomiting.   Musculoskeletal:  Positive for arthralgias. Negative for back pain, neck pain and neck stiffness.   Neurological:  Negative for dizziness, weakness, numbness and headaches.       Physical Exam     Initial Vitals [12/25/24 1553]   BP Pulse Resp Temp SpO2   113/64 75 20 98.5 °F (36.9 °C) 95 %      MAP       --         Physical Exam    Nursing note and vitals reviewed.  Constitutional: She appears well-developed and well-nourished. No distress.   HENT:   Head: Normocephalic. Mouth/Throat: Oropharynx is clear and moist.   Small right parietal/vertex scalp contusion, no laceration   Eyes: Conjunctivae are normal. Pupils are equal, round, and reactive to light.   Neck: Neck supple.   No point vertebral tenderness, no step-off deformity, full, active range of motion without pain or hesitation   Normal range of motion.  Cardiovascular:  Normal rate, regular rhythm and intact distal pulses.           Pulmonary/Chest: Breath sounds normal. No respiratory distress. She exhibits no tenderness.   Abdominal: Abdomen is soft. She exhibits no distension. There is no abdominal tenderness.   Musculoskeletal:         General: Normal range of motion.      Cervical back: Normal range of motion and neck supple.      Comments: No midline cervical, thoracic, or lumbar tenderness to palpation, no step-off deformity.    No shortening or rotational deformity of the lower extremities.    Pain with internal rotation, external rotation and flexion of the right hip, no pain with palpation of the distal femur/knee or ankle.  No pain with range motion of the left lower extremity.  No pain with range of motion of bilateral upper extremities.     Neurological: She is alert and oriented to person, place, and time. GCS score is 15. GCS eye subscore is 4. GCS verbal subscore is 5. GCS motor subscore is 6.   Skin: Skin is warm and dry. No rash noted.   Small abrasion/superficial laceration to the right elbow   Psychiatric: She has a normal  mood and affect.         ED Course   Procedures  Labs Reviewed   COMPREHENSIVE METABOLIC PANEL - Abnormal       Result Value    Sodium 141      Potassium 4.4      Chloride 105      CO2 27      Glucose 108      Blood Urea Nitrogen 23.2 (*)     Creatinine 1.55 (*)     Calcium 8.8      Protein Total 6.2      Albumin 3.7      Globulin 2.5      Albumin/Globulin Ratio 1.5      Bilirubin Total 0.5      ALP 56      ALT 12      AST 26      eGFR 33      Anion Gap 9.0      BUN/Creatinine Ratio 15     APTT - Abnormal    PTT 21.6 (*)    CBC WITH DIFFERENTIAL - Abnormal    WBC 7.26      RBC 3.74 (*)     Hgb 11.0 (*)     Hct 33.0 (*)     MCV 88.2      MCH 29.4      MCHC 33.3      RDW 12.1      Platelet 212      MPV 9.3      Neut % 70.7      Lymph % 16.5      Mono % 9.1      Eos % 2.9      Basophil % 0.4      Lymph # 1.20      Neut # 5.13      Mono # 0.66      Eos # 0.21      Baso # 0.03      IG# 0.03      IG% 0.4      NRBC% 0.0     PROTIME-INR - Normal    PT 14.2      INR 1.1     CBC W/ AUTO DIFFERENTIAL    Narrative:     The following orders were created for panel order CBC auto differential.  Procedure                               Abnormality         Status                     ---------                               -----------         ------                     CBC with Differential[[id number]]       Abnormal            Final result                 Please view results for these tests on the individual orders.   TYPE & SCREEN   ABORH RETYPE     EKG Readings: (Independently Interpreted)   Initial Reading: No STEMI. Rhythm: Normal Sinus Rhythm. Heart Rate: 68. ST Segments: Non-Specific ST Segment Depression. Clinical Impression: Normal Sinus Rhythm   12/25/2024 @ 1841       Imaging Results              X-Ray Chest AP Portable (In process)                      CT Cervical Spine Without Contrast (Final result)  Result time 12/25/24 16:48:44      Final result by Nicholas Palmer MD (12/25/24 16:48:44)                   Impression:       No acute fracture or malalignment identified.    Degenerative changes.      Electronically signed by: Nicholas Palmer  Date:    12/25/2024  Time:    16:48               Narrative:    EXAMINATION:  CT CERVICAL SPINE WITHOUT CONTRAST    CLINICAL HISTORY:  Trauma.    TECHNIQUE:  Multidetector axial images were performed of the cervical spine without and.  Images were reconstructed.    Automated exposure control was utilized to minimize radiation dose.  DLP 1248.    COMPARISON:  March 13, 2022    FINDINGS:  Cervical vertebrae stature is maintained.  There is similar grade 1 anterolisthesis of C6 on C7.  Right aspect of C7 vertebral body osteolytic abnormality with internal septations is again similar likely is benign.  No acute fracture or malalignment identified.  There are multilevel degenerative changes which cause mild narrowing of the left neural foramen at C3-C4, moderate narrowing of the left neural foramen at C4-C5, marked right and moderate narrowing of the left neural foramen at C5-C6 and mild narrowing of the left neural foramen at C6-C7.  There is no prevertebral soft tissue prominence.    This study does not exclude the possibility of intrathecal soft tissue, ligamentous or vascular injury.                                       CT Head Without Contrast (Final result)  Result time 12/25/24 16:42:56      Final result by Nicholas Palmer MD (12/25/24 16:42:56)                   Impression:      No acute intracranial findings identified.      Electronically signed by: Nicholas Palmer  Date:    12/25/2024  Time:    16:42               Narrative:    EXAMINATION:  CT HEAD WITHOUT CONTRAST    CLINICAL HISTORY:  Head trauma, minor (Age >= 65y);    TECHNIQUE:  Sequential axial images were performed of the brain without contrast.    Dose product length of 1248 mGycm. Automated exposure control was utilized to minimize radiation dose.    COMPARISON:  .    FINDINGS:  There is no intracranial mass effect, midline shift,  hydrocephalus or hemorrhage. There is no sulcal effacement or low attenuation changes to suggest recent large vessel territory infarction. Chronic appearing periventricular and subcortical white matter low attenuation changes are present and are consistent with chronic microangiopathic ischemia. The ventricular system and sulcal markings prominence is consistent with atrophy. There is no acute extra axial fluid collection.  There is no acute depressed skull fracture.  Bilateral mild mucoperiosteal thickening of the maxillary sinuses.  Otherwise, visualized paranasal sinuses are clear without mucosal thickening, polypoidal abnormality or air-fluid levels. Mastoid air cells aeration is optimal.                                       CT Hip Without Contrast Right (Final result)  Result time 12/25/24 16:43:25      Final result by Chay Amin MD (12/25/24 16:43:25)                   Impression:      There is subcapital right hip fracture with mild impaction.  The imaged bony pelvis is intact.  Within the pelvis there is no free fluid or mass.  Moderate stool in the colon.  There is colonic diverticulosis.  Moderate atherosclerotic disease.      Electronically signed by: Chay Amin  Date:    12/25/2024  Time:    16:43               Narrative:    EXAMINATION:  CT HIP WITHOUT CONTRAST RIGHT    CLINICAL HISTORY:  Fracture, hip;    TECHNIQUE:  Helical acquisition through the right hip without IV contrast.  Three plane reconstructions were provided for review.  mGycm. Automatic exposure control, adjustment of mA/kV or iterative reconstruction technique was used to reduce radiation.    COMPARISON:  Radiographs earlier today                                       X-Ray Hip 2 or 3 views Right with Pelvis when performed (Edited Result - FINAL)  Result time 12/25/24 16:50:56      Addendum (preliminary) 1 of 1 by Nicholas Palmer MD (12/25/24 16:50:56)    EXAMINATION:  XR HIP WITH PELVIS WHEN PERFORMED 2 OR 3 VIEWS  RIGHT    CLINICAL HISTORY:  Fall.    COMPARISON:  None available.    FINDINGS:  There is right femoral subcapital subtle fracture line without displacement or angulation.  Femoral head is situated within the acetabulum.      Right femoral subcapital fracture.  Please further confirm with CT of the right hip.      Electronically signed by: Nicholas Palmer  Date:    12/25/2024  Time:    16:50                 Final result by Nicholas Palmer MD (12/25/24 16:25:29)                   Impression:      Right femur subtle transcervical fracture.  Please further confirm with CT of the right hip.      Electronically signed by: Nicholas Palmer  Date:    12/25/2024  Time:    16:25               Narrative:    EXAMINATION:  XR HIP WITH PELVIS WHEN PERFORMED 2 OR 3 VIEWS RIGHT    CLINICAL HISTORY:  Fall.    COMPARISON:  None available.    FINDINGS:  There is subtle fracture line of the right femoral neck without significant displacement or angulation.  Femoral head is situated within the acetabulum.                                       Medications   morphine injection 4 mg (has no administration in time range)   ondansetron injection 4 mg (has no administration in time range)     Medical Decision Making  Problems Addressed:  Closed fracture of neck of right femur, initial encounter: acute illness or injury  Fall: acute illness or injury  Minor head injury, initial encounter: acute illness or injury    Amount and/or Complexity of Data Reviewed  Labs: ordered.  Radiology: ordered.    Risk  Prescription drug management.  Decision regarding hospitalization.      ED assessment:    Ms. Del Toro presented for evaluation after mechanical fall at home.  Did strike her head, no LOC. complaining of right hip pain with movement, unable to bear weight without assistance.  No obvious deformity.    Differential diagnosis (including but not limited to):   Closed head injury, intracranial hemorrhage, cerebral contusion, concussion, occult cervical spine  fracture considered given her advanced age and head strike, hip fracture, hip contusion, pubic ramus fracture, pelvic fracture     ED management:   See ED course below    My independent radiology interpretation:   XR hip/pelvis: femoral neck fracture, nondisplaced  CT head: No acute intracranial hemorrhage  CT hip: subcapital hip fracture    Amount and/or Complexity of Data Reviewed  Independent historian: EMS   Summary of history:  Mechanical fall at home.  No medications administered prior to arrival.  No anticoagulant use  External data reviewed: notes from clinic visits and prescription medications   Summary of data reviewed:  Most recent EMR visit in August with COVID-19, cough.  No recent inpatient or ER encounters related to falls.  Last tetanus immunization updated in May of this year.  No anticoagulant use.  Risk and benefits of testing: discussed   Labs: ordered and reviewed  Radiology: ordered and independent interpretation performed (see above or ED course)    Discussion of management or test interpretation with external provider(s): discussed with hospitalist physician and discussed with orthopedic surgery consultant   Summary of discussion:  As below    Risk  Prescription drug management   Parenteral controlled substances   Shared decision making     Critical Care  none    I, Renetta Monae MD personally performed the history, PE, MDM, and procedures as documented above and agree with the scribe's documentation.              ED Course as of 12/25/24 1853   Wed Dec 25, 2024   1729 Discussed with orthopedics on-call, agrees to follow in consultation.  Hospital Medicine paged for admission. [KS]   1817 Discussed with hospitalist NP who accepts for admission.  [KS]      ED Course User Index  [KS] Renetta Monae MD                           Clinical Impression:  Final diagnoses:  [W19.XXXA] Fall  [Z01.818] Preoperative clearance  [S72.001A] Closed fracture of neck of right femur, initial encounter  (Primary)  [S09.90XA] Minor head injury, initial encounter          ED Disposition Condition    Admit                 Renetta Monae MD  12/25/24 1818       Renetta Monae MD  12/25/24 [id number]

## 2024-12-25 NOTE — PROGRESS NOTES
Right subcapital fem neck fracture non displaced. Medicine admit. OR tomorrow    This note/OR report was created with the assistance of  voice recognition software or phone  dictation.  There may be transcription errors as a result of using this technology however minimal. Effort has been made to assure accuracy of transcription but any obvious errors or omissions should be clarified with the author of the document.       Art Agudelo, DO  Orthopedic Trauma Surgery

## 2024-12-26 ENCOUNTER — ANESTHESIA EVENT (OUTPATIENT)
Dept: SURGERY | Facility: HOSPITAL | Age: 84
End: 2024-12-26
Payer: MEDICARE

## 2024-12-26 ENCOUNTER — ANESTHESIA (OUTPATIENT)
Dept: SURGERY | Facility: HOSPITAL | Age: 84
End: 2024-12-26
Payer: MEDICARE

## 2024-12-26 PROBLEM — S72.001A CLOSED FRACTURE OF NECK OF RIGHT FEMUR: Status: ACTIVE | Noted: 2024-12-26

## 2024-12-26 LAB
ANION GAP SERPL CALC-SCNC: 8 MEQ/L
BASOPHILS # BLD AUTO: 0.03 X10(3)/MCL
BASOPHILS NFR BLD AUTO: 0.4 %
BUN SERPL-MCNC: 23.8 MG/DL (ref 9.8–20.1)
CALCIUM SERPL-MCNC: 8.7 MG/DL (ref 8.4–10.2)
CHLORIDE SERPL-SCNC: 105 MMOL/L (ref 98–107)
CO2 SERPL-SCNC: 27 MMOL/L (ref 23–31)
CREAT SERPL-MCNC: 1.17 MG/DL (ref 0.55–1.02)
CREAT/UREA NIT SERPL: 20
EOSINOPHIL # BLD AUTO: 0.23 X10(3)/MCL (ref 0–0.9)
EOSINOPHIL NFR BLD AUTO: 3.4 %
ERYTHROCYTE [DISTWIDTH] IN BLOOD BY AUTOMATED COUNT: 12.1 % (ref 11.5–17)
GFR SERPLBLD CREATININE-BSD FMLA CKD-EPI: 46 ML/MIN/1.73/M2
GLUCOSE SERPL-MCNC: 103 MG/DL (ref 82–115)
HCT VFR BLD AUTO: 32.5 % (ref 37–47)
HGB BLD-MCNC: 10.8 G/DL (ref 12–16)
IMM GRANULOCYTES # BLD AUTO: 0.02 X10(3)/MCL (ref 0–0.04)
IMM GRANULOCYTES NFR BLD AUTO: 0.3 %
LYMPHOCYTES # BLD AUTO: 1.11 X10(3)/MCL (ref 0.6–4.6)
LYMPHOCYTES NFR BLD AUTO: 16.6 %
MAGNESIUM SERPL-MCNC: 2.1 MG/DL (ref 1.6–2.6)
MCH RBC QN AUTO: 29.2 PG (ref 27–31)
MCHC RBC AUTO-ENTMCNC: 33.2 G/DL (ref 33–36)
MCV RBC AUTO: 87.8 FL (ref 80–94)
MONOCYTES # BLD AUTO: 0.52 X10(3)/MCL (ref 0.1–1.3)
MONOCYTES NFR BLD AUTO: 7.8 %
NEUTROPHILS # BLD AUTO: 4.77 X10(3)/MCL (ref 2.1–9.2)
NEUTROPHILS NFR BLD AUTO: 71.5 %
NRBC BLD AUTO-RTO: 0 %
OHS QRS DURATION: 70 MS
OHS QTC CALCULATION: 421 MS
PLATELET # BLD AUTO: 195 X10(3)/MCL (ref 130–400)
PMV BLD AUTO: 9.4 FL (ref 7.4–10.4)
POTASSIUM SERPL-SCNC: 4 MMOL/L (ref 3.5–5.1)
RBC # BLD AUTO: 3.7 X10(6)/MCL (ref 4.2–5.4)
SODIUM SERPL-SCNC: 140 MMOL/L (ref 136–145)
WBC # BLD AUTO: 6.68 X10(3)/MCL (ref 4.5–11.5)

## 2024-12-26 PROCEDURE — C1713 ANCHOR/SCREW BN/BN,TIS/BN: HCPCS | Performed by: ORTHOPAEDIC SURGERY

## 2024-12-26 PROCEDURE — 27000221 HC OXYGEN, UP TO 24 HOURS

## 2024-12-26 PROCEDURE — 11000001 HC ACUTE MED/SURG PRIVATE ROOM

## 2024-12-26 PROCEDURE — 99223 1ST HOSP IP/OBS HIGH 75: CPT | Mod: 57,ICN,, | Performed by: ORTHOPAEDIC SURGERY

## 2024-12-26 PROCEDURE — 27235 TREAT THIGH FRACTURE: CPT | Mod: RT,,, | Performed by: ORTHOPAEDIC SURGERY

## 2024-12-26 PROCEDURE — 63600175 PHARM REV CODE 636 W HCPCS: Performed by: ORTHOPAEDIC SURGERY

## 2024-12-26 PROCEDURE — 0QS634Z REPOSITION RIGHT UPPER FEMUR WITH INTERNAL FIXATION DEVICE, PERCUTANEOUS APPROACH: ICD-10-PCS | Performed by: ORTHOPAEDIC SURGERY

## 2024-12-26 PROCEDURE — C1769 GUIDE WIRE: HCPCS | Performed by: ORTHOPAEDIC SURGERY

## 2024-12-26 PROCEDURE — 36415 COLL VENOUS BLD VENIPUNCTURE: CPT | Performed by: NURSE PRACTITIONER

## 2024-12-26 PROCEDURE — 36000711: Performed by: ORTHOPAEDIC SURGERY

## 2024-12-26 PROCEDURE — 25000003 PHARM REV CODE 250: Performed by: STUDENT IN AN ORGANIZED HEALTH CARE EDUCATION/TRAINING PROGRAM

## 2024-12-26 PROCEDURE — 63600175 PHARM REV CODE 636 W HCPCS: Performed by: NURSE PRACTITIONER

## 2024-12-26 PROCEDURE — 71000033 HC RECOVERY, INTIAL HOUR: Performed by: ORTHOPAEDIC SURGERY

## 2024-12-26 PROCEDURE — 36000710: Performed by: ORTHOPAEDIC SURGERY

## 2024-12-26 PROCEDURE — 80048 BASIC METABOLIC PNL TOTAL CA: CPT | Performed by: NURSE PRACTITIONER

## 2024-12-26 PROCEDURE — 85025 COMPLETE CBC W/AUTO DIFF WBC: CPT | Performed by: NURSE PRACTITIONER

## 2024-12-26 PROCEDURE — 99499 UNLISTED E&M SERVICE: CPT | Mod: ,,, | Performed by: PHYSICIAN ASSISTANT

## 2024-12-26 PROCEDURE — 63600175 PHARM REV CODE 636 W HCPCS: Performed by: PHYSICIAN ASSISTANT

## 2024-12-26 PROCEDURE — 25000003 PHARM REV CODE 250: Performed by: NURSE PRACTITIONER

## 2024-12-26 PROCEDURE — 37000008 HC ANESTHESIA 1ST 15 MINUTES: Performed by: ORTHOPAEDIC SURGERY

## 2024-12-26 PROCEDURE — 63600175 PHARM REV CODE 636 W HCPCS

## 2024-12-26 PROCEDURE — 25000003 PHARM REV CODE 250

## 2024-12-26 PROCEDURE — 37000009 HC ANESTHESIA EA ADD 15 MINS: Performed by: ORTHOPAEDIC SURGERY

## 2024-12-26 PROCEDURE — 27201423 OPTIME MED/SURG SUP & DEVICES STERILE SUPPLY: Performed by: ORTHOPAEDIC SURGERY

## 2024-12-26 PROCEDURE — 83735 ASSAY OF MAGNESIUM: CPT | Performed by: NURSE PRACTITIONER

## 2024-12-26 DEVICE — 5.0MM TI LOCKING SCR SLF-TPNG W/T25 STARDRIVE 36MM-STERILE: Type: IMPLANTABLE DEVICE | Site: FEMUR | Status: FUNCTIONAL

## 2024-12-26 DEVICE — FEMORAL NECK SYSTEM PLATE 2 HOLE - STERILE: Type: IMPLANTABLE DEVICE | Site: FEMUR | Status: FUNCTIONAL

## 2024-12-26 DEVICE — 5.0MM TI LOCKING SCR SLF-TPNG W/T25 STARDRIVE 34MM-STERILE: Type: IMPLANTABLE DEVICE | Site: FEMUR | Status: FUNCTIONAL

## 2024-12-26 DEVICE — IMPLANTABLE DEVICE: Type: IMPLANTABLE DEVICE | Site: FEMUR | Status: FUNCTIONAL

## 2024-12-26 RX ORDER — ENOXAPARIN SODIUM 100 MG/ML
30 INJECTION SUBCUTANEOUS EVERY 24 HOURS
Status: DISCONTINUED | OUTPATIENT
Start: 2024-12-26 | End: 2024-12-30 | Stop reason: HOSPADM

## 2024-12-26 RX ORDER — PROPOFOL 10 MG/ML
VIAL (ML) INTRAVENOUS
Status: DISCONTINUED | OUTPATIENT
Start: 2024-12-26 | End: 2024-12-26

## 2024-12-26 RX ORDER — CEFAZOLIN SODIUM 2 G/50ML
2 SOLUTION INTRAVENOUS ONCE
Status: COMPLETED | OUTPATIENT
Start: 2024-12-26 | End: 2024-12-26

## 2024-12-26 RX ORDER — ROCURONIUM BROMIDE 10 MG/ML
INJECTION, SOLUTION INTRAVENOUS
Status: DISCONTINUED | OUTPATIENT
Start: 2024-12-26 | End: 2024-12-26

## 2024-12-26 RX ORDER — SODIUM CHLORIDE, SODIUM GLUCONATE, SODIUM ACETATE, POTASSIUM CHLORIDE AND MAGNESIUM CHLORIDE 30; 37; 368; 526; 502 MG/100ML; MG/100ML; MG/100ML; MG/100ML; MG/100ML
INJECTION, SOLUTION INTRAVENOUS CONTINUOUS
OUTPATIENT
Start: 2024-12-26 | End: 2025-01-25

## 2024-12-26 RX ORDER — ONDANSETRON HYDROCHLORIDE 2 MG/ML
INJECTION, SOLUTION INTRAVENOUS
Status: DISCONTINUED | OUTPATIENT
Start: 2024-12-26 | End: 2024-12-26

## 2024-12-26 RX ORDER — OXYCODONE HYDROCHLORIDE 5 MG/1
5 TABLET ORAL EVERY 6 HOURS PRN
Status: DISCONTINUED | OUTPATIENT
Start: 2024-12-26 | End: 2024-12-30 | Stop reason: HOSPADM

## 2024-12-26 RX ORDER — HYDROMORPHONE HYDROCHLORIDE 2 MG/ML
0.4 INJECTION, SOLUTION INTRAMUSCULAR; INTRAVENOUS; SUBCUTANEOUS EVERY 5 MIN PRN
Status: DISCONTINUED | OUTPATIENT
Start: 2024-12-26 | End: 2024-12-26 | Stop reason: HOSPADM

## 2024-12-26 RX ORDER — POLYETHYLENE GLYCOL 3350 17 G/17G
17 POWDER, FOR SOLUTION ORAL DAILY
Status: DISCONTINUED | OUTPATIENT
Start: 2024-12-26 | End: 2024-12-30 | Stop reason: HOSPADM

## 2024-12-26 RX ORDER — MORPHINE SULFATE 4 MG/ML
4 INJECTION, SOLUTION INTRAMUSCULAR; INTRAVENOUS EVERY 6 HOURS PRN
Status: DISCONTINUED | OUTPATIENT
Start: 2024-12-26 | End: 2024-12-30 | Stop reason: HOSPADM

## 2024-12-26 RX ORDER — ACETAMINOPHEN 10 MG/ML
INJECTION, SOLUTION INTRAVENOUS
Status: DISCONTINUED | OUTPATIENT
Start: 2024-12-26 | End: 2024-12-26

## 2024-12-26 RX ORDER — DEXAMETHASONE SODIUM PHOSPHATE 4 MG/ML
INJECTION, SOLUTION INTRA-ARTICULAR; INTRALESIONAL; INTRAMUSCULAR; INTRAVENOUS; SOFT TISSUE
Status: DISCONTINUED | OUTPATIENT
Start: 2024-12-26 | End: 2024-12-26

## 2024-12-26 RX ORDER — OXYCODONE HYDROCHLORIDE 10 MG/1
10 TABLET ORAL EVERY 4 HOURS PRN
Status: DISCONTINUED | OUTPATIENT
Start: 2024-12-26 | End: 2024-12-30 | Stop reason: HOSPADM

## 2024-12-26 RX ORDER — MUPIROCIN 20 MG/G
OINTMENT TOPICAL 2 TIMES DAILY
Status: DISCONTINUED | OUTPATIENT
Start: 2024-12-26 | End: 2024-12-30 | Stop reason: HOSPADM

## 2024-12-26 RX ORDER — GLYCOPYRROLATE 0.2 MG/ML
INJECTION INTRAMUSCULAR; INTRAVENOUS
Status: DISCONTINUED | OUTPATIENT
Start: 2024-12-26 | End: 2024-12-26

## 2024-12-26 RX ORDER — SODIUM CHLORIDE, SODIUM LACTATE, POTASSIUM CHLORIDE, CALCIUM CHLORIDE 600; 310; 30; 20 MG/100ML; MG/100ML; MG/100ML; MG/100ML
INJECTION, SOLUTION INTRAVENOUS CONTINUOUS
OUTPATIENT
Start: 2024-12-26

## 2024-12-26 RX ORDER — CEFAZOLIN 2 G/1
2 INJECTION, POWDER, FOR SOLUTION INTRAMUSCULAR; INTRAVENOUS
Status: COMPLETED | OUTPATIENT
Start: 2024-12-26 | End: 2024-12-27

## 2024-12-26 RX ORDER — VANCOMYCIN HYDROCHLORIDE 1 G/20ML
INJECTION, POWDER, LYOPHILIZED, FOR SOLUTION INTRAVENOUS
Status: DISCONTINUED | OUTPATIENT
Start: 2024-12-26 | End: 2024-12-26 | Stop reason: HOSPADM

## 2024-12-26 RX ORDER — FENTANYL CITRATE 50 UG/ML
INJECTION, SOLUTION INTRAMUSCULAR; INTRAVENOUS
Status: DISCONTINUED | OUTPATIENT
Start: 2024-12-26 | End: 2024-12-26

## 2024-12-26 RX ORDER — LIDOCAINE HYDROCHLORIDE 20 MG/ML
INJECTION INTRAVENOUS
Status: DISCONTINUED | OUTPATIENT
Start: 2024-12-26 | End: 2024-12-26

## 2024-12-26 RX ADMIN — GLYCOPYRROLATE 0.2 MG: 0.2 INJECTION INTRAMUSCULAR; INTRAVENOUS at 10:12

## 2024-12-26 RX ADMIN — FENTANYL CITRATE 50 MCG: 50 INJECTION, SOLUTION INTRAMUSCULAR; INTRAVENOUS at 10:12

## 2024-12-26 RX ADMIN — SUGAMMADEX 200 MG: 100 INJECTION, SOLUTION INTRAVENOUS at 10:12

## 2024-12-26 RX ADMIN — CEFAZOLIN SODIUM 2 G: 2 SOLUTION INTRAVENOUS at 10:12

## 2024-12-26 RX ADMIN — MUPIROCIN: 20 OINTMENT TOPICAL at 12:12

## 2024-12-26 RX ADMIN — SODIUM CHLORIDE, SODIUM LACTATE, POTASSIUM CHLORIDE, CALCIUM CHLORIDE: 20; 30; 600; 310 INJECTION, SOLUTION INTRAVENOUS at 12:12

## 2024-12-26 RX ADMIN — DEXAMETHASONE SODIUM PHOSPHATE 4 MG: 4 INJECTION, SOLUTION INTRA-ARTICULAR; INTRALESIONAL; INTRAMUSCULAR; INTRAVENOUS; SOFT TISSUE at 10:12

## 2024-12-26 RX ADMIN — ONDANSETRON 4 MG: 2 INJECTION INTRAMUSCULAR; INTRAVENOUS at 10:12

## 2024-12-26 RX ADMIN — CEFAZOLIN 2 G: 2 INJECTION, POWDER, FOR SOLUTION INTRAMUSCULAR; INTRAVENOUS at 11:12

## 2024-12-26 RX ADMIN — METHOCARBAMOL 500 MG: 500 TABLET ORAL at 02:12

## 2024-12-26 RX ADMIN — ENOXAPARIN SODIUM 30 MG: 30 INJECTION SUBCUTANEOUS at 04:12

## 2024-12-26 RX ADMIN — LIDOCAINE HYDROCHLORIDE 80 MG: 20 INJECTION INTRAVENOUS at 10:12

## 2024-12-26 RX ADMIN — MUPIROCIN: 20 OINTMENT TOPICAL at 11:12

## 2024-12-26 RX ADMIN — METHOCARBAMOL 500 MG: 500 TABLET ORAL at 08:12

## 2024-12-26 RX ADMIN — SODIUM CHLORIDE: 9 INJECTION, SOLUTION INTRAVENOUS at 10:12

## 2024-12-26 RX ADMIN — ACETAMINOPHEN 1000 MG: 10 INJECTION, SOLUTION INTRAVENOUS at 10:12

## 2024-12-26 RX ADMIN — PROPOFOL 70 MG: 10 INJECTION, EMULSION INTRAVENOUS at 10:12

## 2024-12-26 RX ADMIN — ROCURONIUM BROMIDE 40 MG: 10 SOLUTION INTRAVENOUS at 10:12

## 2024-12-26 NOTE — PLAN OF CARE
Problem: Adult Inpatient Plan of Care  Goal: Plan of Care Review  Reactivated  Goal: Patient-Specific Goal (Individualized)  Reactivated  Goal: Absence of Hospital-Acquired Illness or Injury  Reactivated  Goal: Optimal Comfort and Wellbeing  Reactivated  Goal: Readiness for Transition of Care  Reactivated     Problem: Fall Injury Risk  Goal: Absence of Fall and Fall-Related Injury  Reactivated     Problem: Skin Injury Risk Increased  Goal: Skin Health and Integrity  Reactivated     Problem: Infection  Goal: Absence of Infection Signs and Symptoms  Reactivated     Problem: Wound  Goal: Optimal Coping  Reactivated  Goal: Optimal Functional Ability  Reactivated  Goal: Absence of Infection Signs and Symptoms  Reactivated  Goal: Improved Oral Intake  Reactivated  Goal: Optimal Pain Control and Function  Reactivated  Goal: Skin Health and Integrity  Reactivated  Goal: Optimal Wound Healing  Reactivated

## 2024-12-26 NOTE — H&P
[facility] Hospital Medicine - H&P Note    Patient Name: Trever Del Toro  : 1940  MRN: [id number]  PCP: Rose Trujillo MD  Admitting Physician:  MONIQUE Allen MD  Admission Class: IP- Inpatient   Code status: Full    Allergies   Cephalexin, Amoxicillin-pot clavulanate, and Codeine    Chief Complaint   Right hip pain    History of Present Illness   84 yr old female whose history includes CKD 3A and hypothyroidism. At the time of my exam patient is AAO x 3. Family at bedside. Presented to ED following a fall at home. Reports she was wearing slippery shoes and when she went to turn around she fell on right side. Following fall she was able to stand with assistance but unable to bear weight on RLE secondary to pain. Denies syncope. At baseline she lives at home alone and is independent of ADLs. Comfortable at time of my exam and pain well controlled.     VS on arrival: T 98.5, P 75, R 20, B/P 113/64, Sats 95% on room air. Initial labs include BUN 23, creatinine 1.55 and otherwise unremarkable. CT RLE shows subcapital right hip fracture with mild impaction. CT head and C-spine without acute findings. EKG sinus rhythm with no ischemic changes.     ROS   Except as documented, all other systems reviewed and negative     Past Medical History   CKD 3A  Dyslipidemia  Hypothyroidism  Overactive bladder  Depression/anxiety     Past Surgical History   Cholecystectomy  Hysterectomy  Rectocele repair  Thyroid surgery    Social History   Denies alcohol, tobacco or illicit drug use.     Screening for Social Drivers for health:  Patient screened for food insecurity, housing instability, transportation needs, utility difficulties, and interpersonal safety (select all that apply as identified as concern)  []Housing or Food  []Transportation Needs  []Utility Difficulties  []Interpersonal safety  [x]None        Family History   Reviewed and negative    Home Medications     Prior to Admission medications  "   Medication Sig Start Date End Date Taking? Authorizing Provider   aspirin (ECOTRIN) 81 MG EC tablet Take 81 mg by mouth once daily.    Provider, Historical   cetirizine (ZYRTEC) 10 MG tablet TAKE 1 TABLET BY MOUTH EVERY DAY 3/10/23   Gustavo Butler MD   EScitalopram oxalate (LEXAPRO) 10 MG tablet Take 1 tablet (10 mg total) by mouth once daily. 2/23/23   Gustavo Butler MD                   levothyroxine (SYNTHROID) 50 MCG tablet Take 1 tablet (50 mcg total) by mouth before breakfast. 9/12/22   Gustavo Butler MD                   omeprazole (PRILOSEC) 20 MG capsule Take 1 capsule (20 mg total) by mouth once daily. 3/12/24   Rose Trujillo MD   pravastatin (PRAVACHOL) 20 MG tablet Take 1 tablet (20 mg total) by mouth once daily. 3/12/24   Rose Trujillo MD                   vitamin D (VITAMIN D3) 1000 units Tab Take 1,000 Units by mouth once daily.    Provider, Historical        Physical Exam   Vital Signs  Temp:  [98.5 °F (36.9 °C)]   Pulse:  [75-77]   Resp:  [17-20]   BP: (113-123)/(63-64)   SpO2:  [94 %-95 %]    General: Appears comfortable  HEENT: NC/AT  Neck:  No JVD  Chest: CTABL  CVS: Regular rhythm. Normal S1/S2.  Abdomen: nondistended, normoactive BS, soft and non-tender.  MSK: limited ROM RLE secondary to pain, no swelling or ecchymosis in groin, distal pulses strong and palpable, neurovascular intact  Skin: Warm and dry  Neuro: AAOx3, no focal neurological deficit  Psych: Cooperative    Labs     Recent Labs     12/25/24  1642   WBC 7.26   RBC 3.74*   HGB 11.0*   HCT 33.0*   MCV 88.2   MCH 29.4   MCHC 33.3   RDW 12.1        Recent Labs     12/25/24  1642   INR 1.1      Recent Labs     12/25/24  1642      K 4.4   CO2 27   BUN 23.2*   CREATININE 1.55*   EGFRNORACEVR 33   GLUCOSE 108   CALCIUM 8.8   ALBUMIN 3.7   GLOBULIN 2.5   ALKPHOS 56   ALT 12   AST 26   BILITOT 0.5     No results for input(s): "LACTIC" in the last 72 hours.  No results for input(s): "CPK", "TROPONINI" in " the last 72 hours.     Microbiology Results (last 7 days)       ** No results found for the last 168 hours. **           Imaging   X-Ray Hip 2 or 3 views Right with Pelvis when performed  Addendum: EXAMINATION:   XR HIP WITH PELVIS WHEN PERFORMED 2 OR 3 VIEWS RIGHT     CLINICAL HISTORY:   Fall.     COMPARISON:   None available.     FINDINGS:   There is right femoral subcapital subtle fracture line without  displacement or angulation.  Femoral head is situated within the  acetabulum.     Right femoral subcapital fracture.  Please further confirm with CT of the  right hip.     Electronically signed by: Nicholas Palmer   Date:    12/25/2024   Time:    16:50  Narrative: EXAMINATION:  XR HIP WITH PELVIS WHEN PERFORMED 2 OR 3 VIEWS RIGHT    CLINICAL HISTORY:  Fall.    COMPARISON:  None available.    FINDINGS:  There is subtle fracture line of the right femoral neck without significant displacement or angulation.  Femoral head is situated within the acetabulum.  Impression: Right femur subtle transcervical fracture.  Please further confirm with CT of the right hip.    Electronically signed by: Nicholas Palmer  Date:    12/25/2024  Time:    16:25  CT Cervical Spine Without Contrast  Narrative: EXAMINATION:  CT CERVICAL SPINE WITHOUT CONTRAST    CLINICAL HISTORY:  Trauma.    TECHNIQUE:  Multidetector axial images were performed of the cervical spine without and.  Images were reconstructed.    Automated exposure control was utilized to minimize radiation dose.  DLP 1248.    COMPARISON:  March 13, 2022    FINDINGS:  Cervical vertebrae stature is maintained.  There is similar grade 1 anterolisthesis of C6 on C7.  Right aspect of C7 vertebral body osteolytic abnormality with internal septations is again similar likely is benign.  No acute fracture or malalignment identified.  There are multilevel degenerative changes which cause mild narrowing of the left neural foramen at C3-C4, moderate narrowing of the left neural foramen at C4-C5,  marked right and moderate narrowing of the left neural foramen at C5-C6 and mild narrowing of the left neural foramen at C6-C7.  There is no prevertebral soft tissue prominence.    This study does not exclude the possibility of intrathecal soft tissue, ligamentous or vascular injury.  Impression: No acute fracture or malalignment identified.    Degenerative changes.    Electronically signed by: Nicholas Palmer  Date:    12/25/2024  Time:    16:48  CT Hip Without Contrast Right  Narrative: EXAMINATION:  CT HIP WITHOUT CONTRAST RIGHT    CLINICAL HISTORY:  Fracture, hip;    TECHNIQUE:  Helical acquisition through the right hip without IV contrast.  Three plane reconstructions were provided for review.  mGycm. Automatic exposure control, adjustment of mA/kV or iterative reconstruction technique was used to reduce radiation.    COMPARISON:  Radiographs earlier today  Impression: There is subcapital right hip fracture with mild impaction.  The imaged bony pelvis is intact.  Within the pelvis there is no free fluid or mass.  Moderate stool in the colon.  There is colonic diverticulosis.  Moderate atherosclerotic disease.    Electronically signed by: Chay Amin  Date:    12/25/2024  Time:    16:43  CT Head Without Contrast  Narrative: EXAMINATION:  CT HEAD WITHOUT CONTRAST    CLINICAL HISTORY:  Head trauma, minor (Age >= 65y);    TECHNIQUE:  Sequential axial images were performed of the brain without contrast.    Dose product length of 1248 mGycm. Automated exposure control was utilized to minimize radiation dose.    COMPARISON:  .    FINDINGS:  There is no intracranial mass effect, midline shift, hydrocephalus or hemorrhage. There is no sulcal effacement or low attenuation changes to suggest recent large vessel territory infarction. Chronic appearing periventricular and subcortical white matter low attenuation changes are present and are consistent with chronic microangiopathic ischemia. The ventricular system and  sulcal markings prominence is consistent with atrophy. There is no acute extra axial fluid collection.  There is no acute depressed skull fracture.  Bilateral mild mucoperiosteal thickening of the maxillary sinuses.  Otherwise, visualized paranasal sinuses are clear without mucosal thickening, polypoidal abnormality or air-fluid levels. Mastoid air cells aeration is optimal.  Impression: No acute intracranial findings identified.    Electronically signed by: Nicholas Palmer  Date:    12/25/2024  Time:    16:42    Assessment & Plan     Right subcapital femoral neck fracture secondary to mechanical fall  - NPO after midnight for surgery with Dr. Agudelo in AM  - PRN analgesics   - may need inpatient rehab at discharge     WALLY on CKD 3A  - avoid nephrotoxic meds and monitor  - gentle hydration - LR at 75 ml/hr  - labs in AM     PMH: hypothyroidism, Depression, anxiety     VTE Prophylaxis: Vivek SANTO, Lamar Hobson, FNP-BC have discussed this patients case with Dr. Allen who agrees with the diagnosis and treatment plan.        ________________________________________________________________________  I, Dr. Prasanna Allen assumed care of this patient.  For the patient encounter, I performed the substantive portion of the visit, I reviewed the NPPA documentation, treatment plan, and medical decision making.  I had face to face time with this patient.  I have personally reviewed the labs and test results that are presently available. I have reviewed or attempted to review medical records based upon their availability. If patient was admitted under observational status it is with my approval.      Very pleasant female, no cardiac history, low risk for orthopedic surgery.  Cardiovascular regular rate and rhythm on exam, abdomen soft.  NPO after midnight for surgery tomorrow.    Time seen: 11PM 12/25  Prasanna Allen MD

## 2024-12-26 NOTE — PROGRESS NOTES
Pharmacist Renal Dose Adjustment Note    Trever Del Toro is a 84 y.o. female being treated with the medication cefazolin    Patient Data:    Vital Signs (Most Recent):  Temp: 96.8 °F (36 °C) (12/26/24 1055)  Pulse: 72 (12/26/24 1055)  Resp: 17 (12/26/24 1055)  BP: (!) 95/54 (12/26/24 1055)  SpO2: 95 % (12/26/24 1055) Vital Signs (72h Range):  Temp:  [96.8 °F (36 °C)-98.5 °F (36.9 °C)]   Pulse:  [63-77]   Resp:  [15-25]   BP: ()/(54-92)   SpO2:  [92 %-97 %]      Recent Labs   Lab 12/25/24  1642 12/26/24  0501   CREATININE 1.55* 1.17*     Serum creatinine: 1.17 mg/dL (H) 12/26/24 0501  Estimated creatinine clearance: 27.8 mL/min (A)    Medication:cefazolin dose: 2g frequency q8h will be changed to medication:cefazolin dose:2g frequency:q12h for CrCl 10-30    Pharmacist's Name: Romy Alan  Pharmacist's Extension: [phone]

## 2024-12-26 NOTE — OP NOTE
OPERATIVE REPORT    Patient: Trever Del Toro   : 1940    MRN: [id number]  Date: 2024      Surgeon:Art Agudelo DO  Assistant: Magnus Santiago was essential, part of the procedure including deep hardware placement and deep closure.  No senior assistant was availible  Preoperative Diagnosis: Right femoral neck fracture  Postoperative Diagnosis: Same  Procedure:    Closed reduction and percutaneous pinning right femoral neck fracture CPT 99774  Anesthesiologist: Saulo Richey MD  OR Staff: Circulator: Jamie Mayen RN  Physician Assistant: Avis Santiago PA-C  Scrub Person: Ronan Miller ST; Tatyana Jackson ST  Implants:   Implant Name Type Inv. Item Serial No.  Lot No. LRB No. Used Action   PLATE FEM NECK STRL 2H 130DEG - [id number]  PLATE FEM NECK STRL 2H 130DEG  SYNTHES [id number] Right 1 Implanted   SCREW KAREEM W T25 TI 5.0X34MM - [id number]  SCREW KAREEM W T25 TI 5.0X34MM  SYN[employer]ES [id number] Right 1 Implanted   SCREW KAREEM ST T25 STRL 5.0X36MM - [id number]  SCREW KAREEM ST T25 STRL 5.0X36MM  SYNTHES [id number] Right 1 Implanted   SCREW ANTIROTATION 85MM - [id number]  SCREW ANTIROTATION 85MM  SYNTHES [id number] Right 1 Implanted and Explanted   BOLT FEM NECK TI STRL 75MM - [id number]  BOLT FEM NECK TI STRL 75MM  SYNTHES [id number] Right 1 Implanted   WIRE GUIDE 3.2MM 400MM - [id number]  WIRE GUIDE 3.2MM 400MM  SYNTHES  Right 1 Implanted and Explanted   SCREW ANTIROTATION 80MM - [id number]  SCREW ANTIROTATION 80MM  SYNTHES [id number] Right 1 Implanted     EBL: 50cc  Complications: None  Disposition: To PACU, stable    Indications: Trever Del Toro is a 84 y.o. female presenting with the aforementioned injuries/findings. The procedure is indicated to best obtain alignment and stability about the hip.  The patient is awake and alert. After thorough discussion of the risks, benefits, expected outcomes, and alternatives to surgical intervention, the patient agreed to proceed with surgical treatment.   Specific risks discussed included, but were not limited to: superficial or deep infection, wound healing complications, DVT/PE, significant bleeding requiring transfusion, damage to named anatomic structures in the immediate area including named neurovascular structures, infection, nonunion/malunion, avascular necrosis and post-traumatic arthritis, chronic pain, gait changes or inability to ambulate, and general risks of anesthesia.  The patient voiced understanding and written as well as verbal consent was obtained by myself prior to the procedure.      Procedure Note:  The patient's right lower extremity was marked by me in the preoperative area.  She was taken to the operating room, and after satisfactory induction of anesthesia,the patient was positioned supine on a fracture table. A well-padded perineal post was used, and the bilateral feet were placed in gentle in-line traction.  We placed the contralateral limb in a scissored position, and then applied traction and internal rotation to the operative limb.      The operative hip was then prepped and draped in the usual sterile fashion. The universal time-out was then performed, with review of the patient, side/site, procedure, and preoperative antibiotics.    We brought in C-arm to locate our incision and guide wire sites.  The hip anatomy was marked and we made a small  incision for our Synthes FNS guide wire.  The wire for the Synthes cannulated screw system was then advanced from the lateral cortex into the femoral head.  This was verified on biplanar fluoroscopy, and we then placed the aiming jig and used this to establish a canal for the peg and interference screw. We then locked the plate without difficulty.      Final C-arm images were then obtained and reviewed; these were saved to the [employer] system.  The hip was then cleansed and dressed, and the limbs were then taken out of traction.  The lower extremity compartments were soft and compressible after  the procedure.  The patient was then subsequently extubated and transferred to to PACU in a stable condition.    All sponge and needle counts were correct at the end of the case.  I was present and participated in all aspects of the procedure.    Prognosis:  The patient will be allowed to WBAT and do activities as tolerated.  We will continue with DVT prophylaxis per protocol, and begin PT postoperatively.  The patient is at risk of falls, and will be carefully monitored postoperatively.  We will continue with 24 hours of parenteral antibiotics as well.  Patient may need return to the OR for excisional debridement or washout if infection/skin necrosis is observed.        This note/OR report was created with the assistance of  voice recognition software or phone  dictation.  There may be transcription errors as a result of using this technology however minimal. Effort has been made to assure accuracy of transcription but any obvious errors or omissions should be clarified with the author of the document.       Art Agudelo, DO  Orthopedic Trauma Surgery

## 2024-12-26 NOTE — ANESTHESIA PROCEDURE NOTES
Intubation    Date/Time: 12/26/2024 10:09 AM    Performed by: Prasanna Myers CRNA  Authorized by: Saulo Richey MD    Intubation:     Induction:  Intravenous    Intubated:  Postinduction    Mask Ventilation:  Easy mask    Attempts:  1    Attempted By:  CRNA    Method of Intubation:  Direct    Blade:  Valle 2    Laryngeal View Grade: Grade I - full view of cords      Difficult Airway Encountered?: No      Complications:  None    Airway Device:  Oral endotracheal tube    Airway Device Size:  7.0    Style/Cuff Inflation:  Cuffed (inflated to minimal occlusive pressure)    Inflation Amount (mL):  7    Tube secured:  22    Secured at:  The lips    Placement Verified By:  Capnometry    Complicating Factors:  None    Findings Post-Intubation:  BS equal bilateral and atraumatic/condition of teeth unchanged

## 2024-12-26 NOTE — PROGRESS NOTES
Medardo Donaldson  : 1950  Age 75year old  female patient is admitted to [facility] for ROSEMARIE.     Chief complaint: Follow up Dementia    HPI  Patient is a 75year old female with medical history significant for anxiety and  misael Pharmacist Renal Dose Adjustment Note    Trever Del Toro is a 84 y.o. female being treated with the medication lovenox    Patient Data:    Vital Signs (Most Recent):  Temp: 96.8 °F (36 °C) (12/26/24 1055)  Pulse: 72 (12/26/24 1055)  Resp: 17 (12/26/24 1055)  BP: (!) 95/54 (12/26/24 1055)  SpO2: 95 % (12/26/24 1055) Vital Signs (72h Range):  Temp:  [96.8 °F (36 °C)-98.5 °F (36.9 °C)]   Pulse:  [63-77]   Resp:  [15-25]   BP: ()/(54-92)   SpO2:  [92 %-97 %]      Recent Labs   Lab 12/25/24  1642 12/26/24  0501   CREATININE 1.55* 1.17*     Serum creatinine: 1.17 mg/dL (H) 12/26/24 0501  Estimated creatinine clearance: 27.8 mL/min (A)    Medication:lovenox dose: 40mg frequency daily will be changed to medication:lovenox dose:30mg frequency:daily for CrCl < 30    Pharmacist's Name: Romy Alan  Pharmacist's Extension: [phone]     Alcohol use: No                ALLERGIES:  No Known Allergies    CODE STATUS: Full Code      CURRENT MEDICATIONS:Reviewed in SNF EMR    VITALS: WNL and afebrile     REVIEW OF SYSTEMS: +Confused with hx of advanced Dementia. No chest pain, no short

## 2024-12-26 NOTE — OP NOTE
[facility] - 8th Floor Med Surg  Orthopedic Trauma  Consult Note    Patient Name: Trever Del Toro  MRN: [id number]  Admission Date: 12/25/2024  Hospital Length of Stay: 1 days  Attending Provider: Shanta Malave DO  Primary Care Provider: Rose Trujillo MD        Consults  Subjective:         Chief Complaint:   Chief Complaint   Patient presents with    Fall     Trip and fall after bumping into sister while in kitchen. Denies LOC, BT. Denies any pain. Hit head on doorknob.         HPI:  Patient has Right hip pain status post ground level fall.  She is diagnosed with a nondisplaced femoral neck proximal femur fracture dull achy pain to the hip without radiation no previous injury.  Patient has no numbness or tingling.  Pain medication makes it better rest makes it better movement makes it worse.    Past Medical History:   Diagnosis Date    Anticoagulant long-term use     asa 81mg    High cholesterol     Stroke     TIA many years ago // no deficeit    Thyroid disease        Past Surgical History:   Procedure Laterality Date    DILATION AND CURETTAGE OF UTERUS      GALLBLADDER SURGERY      HYSTERECTOMY      REPAIR OF RECTOCELE      REVISION OF PUBOVAGINAL SLING N/A 05/04/2021    Procedure: REVISION, PUBOVAGINAL SLING;  Surgeon: Evin Miguel MD;  Location: [facility] OR [address] [place] [address];  Service: Urology;  Laterality: N/A;  1hr    THYROID SURGERY Right     TONSILLECTOMY         Review of patient's allergies indicates:   Allergen Reactions    Cephalexin Other (See Comments)    Amoxicillin-pot clavulanate Nausea And Vomiting    Codeine Nausea And Vomiting       Current Facility-Administered Medications   Medication    acetaminophen tablet 1,000 mg    aluminum-magnesium hydroxide-simethicone 200-200-20 mg/5 mL suspension 30 mL    bisacodyL suppository 10 mg    dextrose 50% injection 12.5 g    dextrose 50% injection 25 g    [START ON 12/27/2024] EScitalopram oxalate tablet 10 mg    glucagon (human recombinant)  "injection 1 mg    glucose chewable tablet 16 g    glucose chewable tablet 24 g    lactated ringers infusion    [START ON 12/27/2024] levothyroxine tablet 50 mcg    melatonin tablet 6 mg    methocarbamoL tablet 500 mg    morphine injection 4 mg    naloxone 0.4 mg/mL injection 0.02 mg    ondansetron injection 4 mg    ondansetron injection 4 mg    [START ON 12/27/2024] pravastatin tablet 20 mg    prochlorperazine injection Soln 5 mg    senna-docusate 8.6-50 mg per tablet 1 tablet    sodium chloride 0.9% flush 10 mL     Family History    Family history is unknown by patient.       Tobacco Use    Smoking status: Never    Smokeless tobacco: Never   Substance and Sexual Activity    Alcohol use: Not Currently    Drug use: Never    Sexual activity: Not Currently       ROS:  Constitutional: Denies fever chills  Eyes: No change in vision  ENT: No ringing or current infections  CV: No chest pain  Resp: No labored breathing  MSK: Pain evident at site of injury located in HPI,   Integ: No signs of abrasions or lacerations  Neuro: No numbness or tingling  Lymphatic: No swelling outside the area of injury   Objective:     Vital Signs (Most Recent):  Temp: 98.1 °F (36.7 °C) (12/26/24 0436)  Pulse: 63 (12/26/24 0436)  Resp: 16 (12/26/24 0436)  BP: (!) 148/86 (12/26/24 0436)  SpO2: 95 % (12/26/24 0436) Vital Signs (24h Range):  Temp:  [97.8 °F (36.6 °C)-98.5 °F (36.9 °C)] 98.1 °F (36.7 °C)  Pulse:  [63-77] 63  Resp:  [15-25] 16  SpO2:  [92 %-97 %] 95 %  BP: (111-163)/(63-86) 148/86     Weight: 58.1 kg (128 lb)  Height: 5' 2" (157.5 cm)  Body mass index is 23.41 kg/m².      Intake/Output Summary (Last 24 hours) at 12/26/2024 0718  Last data filed at 12/26/2024 0613  Gross per 24 hour   Intake --   Output 600 ml   Net -600 ml       Ortho/SPM Exam  General the patient is alert no acute distress nontoxic-appearing appropriate affect.    Constitutional: Vital signs are examined and stable.  Resp: No signs of labored breathing             "                         RLE: -Skin:  Groin pain with gentle logroll           -MSK: : EHL/FHL, Gastroc/Tib anterior Strength 5/5           -Neuro:  Sensation intact to light touch L3-S1 dermatomes           -Lymphatic: No signs of lymphadenopathy           -CV: Capillary refill is less than 2 seconds. DP/PT pulses  2/4. Compartments soft and compressible.     Significant Labs:   Recent Lab Results         12/26/24  0501   12/25/24  1846   12/25/24  1759   12/25/24  1642        Albumin/Globulin Ratio       1.5       ABO and RH   B POS           Albumin       3.7       ALP       56       ALT       12       Anion Gap 8.0       9.0       PTT       21.6  Comment: For Minimal Heparin Infusion, the goal aPTT 64-85 seconds corresponds to an anti-Xa of 0.3-0.5.    For Low Intensity and High Intensity Heparin, the goal aPTT  seconds corresponds to an anti-Xa of 0.3-0.7       AST       26       Baso # 0.03       0.03       Basophil % 0.4       0.4       BILIRUBIN TOTAL       0.5       BUN 23.8       23.2       BUN/CREAT RATIO 20       15       Calcium 8.7       8.8       Chloride 105       105       CO2 27       27       Creatinine 1.17       1.55       eGFR 46       33       Eos # 0.23       0.21       Eos % 3.4       2.9       Globulin, Total       2.5       Glucose 103       108       Group & Rh     B POS         Hematocrit 32.5       33.0       Hemoglobin 10.8       11.0       Immature Grans (Abs) 0.02       0.03       Immature Granulocytes 0.3       0.4       Indirect Dalia GEL     NEG         INR       1.1       Lymph # 1.11       1.20       LYMPH % 16.6       16.5       Magnesium  2.10             MCH 29.2       29.4       MCHC 33.2       33.3       MCV 87.8       88.2       Mono # 0.52       0.66       Mono % 7.8       9.1       MPV 9.4       9.3       Neut # 4.77       5.13       Neut % 71.5       70.7       nRBC 0.0       0.0       Platelet Count 195       212       Potassium 4.0       4.4       PROTEIN TOTAL        6.2       PT       14.2       RBC 3.70       3.74       RDW 12.1       12.1       Sodium 140       141       Specimen Outdate     12/28/2024 23:59         WBC 6.68       7.26             All pertinent labs within the past 24 hours have been reviewed.  Recent Lab Results         12/26/24  0501   12/25/24  1846   12/25/24  1759   12/25/24  1642        Albumin/Globulin Ratio       1.5       ABO and RH   B POS           Albumin       3.7       ALP       56       ALT       12       Anion Gap 8.0       9.0       PTT       21.6  Comment: For Minimal Heparin Infusion, the goal aPTT 64-85 seconds corresponds to an anti-Xa of 0.3-0.5.    For Low Intensity and High Intensity Heparin, the goal aPTT  seconds corresponds to an anti-Xa of 0.3-0.7       AST       26       Baso # 0.03       0.03       Basophil % 0.4       0.4       BILIRUBIN TOTAL       0.5       BUN 23.8       23.2       BUN/CREAT RATIO 20       15       Calcium 8.7       8.8       Chloride 105       105       CO2 27       27       Creatinine 1.17       1.55       eGFR 46       33       Eos # 0.23       0.21       Eos % 3.4       2.9       Globulin, Total       2.5       Glucose 103       108       Group & Rh     B POS         Hematocrit 32.5       33.0       Hemoglobin 10.8       11.0       Immature Grans (Abs) 0.02       0.03       Immature Granulocytes 0.3       0.4       Indirect Dalia GEL     NEG         INR       1.1       Lymph # 1.11       1.20       LYMPH % 16.6       16.5       Magnesium  2.10             MCH 29.2       29.4       MCHC 33.2       33.3       MCV 87.8       88.2       Mono # 0.52       0.66       Mono % 7.8       9.1       MPV 9.4       9.3       Neut # 4.77       5.13       Neut % 71.5       70.7       nRBC 0.0       0.0       Platelet Count 195       212       Potassium 4.0       4.4       PROTEIN TOTAL       6.2       PT       14.2       RBC 3.70       3.74       RDW 12.1       12.1       Sodium 140       141       Specimen  Outdate     12/28/2024 23:59         WBC 6.68       7.26                Significant Imaging: I have reviewed all pertinent imaging results/findings.  X-Ray Chest AP Portable    Result Date: 12/26/2024  EXAMINATION: XR CHEST AP PORTABLE CLINICAL HISTORY: , Encounter for other preprocedural examination. FINDINGS: No alveolar consolidation, effusion, or pneumothorax is seen.   The thoracic aorta is normal  cardiac silhouette, central pulmonary vessels and mediastinum are normal in size and are grossly unremarkable.   visualized osseous structures are grossly unremarkable.     No acute chest disease is identified. Electronically signed by: Anibal Dominique Date:    12/26/2024 Time:    05:59    X-Ray Hip 2 or 3 views Right with Pelvis when performed    Addendum Date: 12/25/2024    EXAMINATION: XR HIP WITH PELVIS WHEN PERFORMED 2 OR 3 VIEWS RIGHT CLINICAL HISTORY: Fall. COMPARISON: None available. FINDINGS: There is right femoral subcapital subtle fracture line without displacement or angulation.  Femoral head is situated within the acetabulum. Right femoral subcapital fracture.  Please further confirm with CT of the right hip. Electronically signed by: Nicholas Palmer Date:    12/25/2024 Time:    16:50    Result Date: 12/25/2024  EXAMINATION: XR HIP WITH PELVIS WHEN PERFORMED 2 OR 3 VIEWS RIGHT CLINICAL HISTORY: Fall. COMPARISON: None available. FINDINGS: There is subtle fracture line of the right femoral neck without significant displacement or angulation.  Femoral head is situated within the acetabulum.     Right femur subtle transcervical fracture.  Please further confirm with CT of the right hip. Electronically signed by: Nicholas Palmer Date:    12/25/2024 Time:    16:25    CT Cervical Spine Without Contrast    Result Date: 12/25/2024  EXAMINATION: CT CERVICAL SPINE WITHOUT CONTRAST CLINICAL HISTORY: Trauma. TECHNIQUE: Multidetector axial images were performed of the cervical spine without and.  Images were  reconstructed. Automated exposure control was utilized to minimize radiation dose.  DLP 1248. COMPARISON: March 13, 2022 FINDINGS: Cervical vertebrae stature is maintained.  There is similar grade 1 anterolisthesis of C6 on C7.  Right aspect of C7 vertebral body osteolytic abnormality with internal septations is again similar likely is benign.  No acute fracture or malalignment identified.  There are multilevel degenerative changes which cause mild narrowing of the left neural foramen at C3-C4, moderate narrowing of the left neural foramen at C4-C5, marked right and moderate narrowing of the left neural foramen at C5-C6 and mild narrowing of the left neural foramen at C6-C7.  There is no prevertebral soft tissue prominence. This study does not exclude the possibility of intrathecal soft tissue, ligamentous or vascular injury.     No acute fracture or malalignment identified. Degenerative changes. Electronically signed by: Nicholas Palmer Date:    12/25/2024 Time:    16:48    CT Hip Without Contrast Right    Result Date: 12/25/2024  EXAMINATION: CT HIP WITHOUT CONTRAST RIGHT CLINICAL HISTORY: Fracture, hip; TECHNIQUE: Helical acquisition through the right hip without IV contrast.  Three plane reconstructions were provided for review.  mGycm. Automatic exposure control, adjustment of mA/kV or iterative reconstruction technique was used to reduce radiation. COMPARISON: Radiographs earlier today     There is subcapital right hip fracture with mild impaction.  The imaged bony pelvis is intact.  Within the pelvis there is no free fluid or mass.  Moderate stool in the colon.  There is colonic diverticulosis.  Moderate atherosclerotic disease. Electronically signed by: Chay Amin Date:    12/25/2024 Time:    16:43    CT Head Without Contrast    Result Date: 12/25/2024  EXAMINATION: CT HEAD WITHOUT CONTRAST CLINICAL HISTORY: Head trauma, minor (Age >= 65y); TECHNIQUE: Sequential axial images were performed of the brain  without contrast. Dose product length of 1248 mGycm. Automated exposure control was utilized to minimize radiation dose. COMPARISON: . FINDINGS: There is no intracranial mass effect, midline shift, hydrocephalus or hemorrhage. There is no sulcal effacement or low attenuation changes to suggest recent large vessel territory infarction. Chronic appearing periventricular and subcortical white matter low attenuation changes are present and are consistent with chronic microangiopathic ischemia. The ventricular system and sulcal markings prominence is consistent with atrophy. There is no acute extra axial fluid collection.  There is no acute depressed skull fracture.  Bilateral mild mucoperiosteal thickening of the maxillary sinuses.  Otherwise, visualized paranasal sinuses are clear without mucosal thickening, polypoidal abnormality or air-fluid levels. Mastoid air cells aeration is optimal.     No acute intracranial findings identified. Electronically signed by: Nicholas Palmer Date:    12/25/2024 Time:    16:42       Assessment/Plan:     Active Diagnoses:    Diagnosis Date Noted POA    PRINCIPAL PROBLEM:  Closed fracture of neck of right femur [S72.001A] 12/26/2024 Yes      Problems Resolved During this Admission:       Independent Radiology ordered by other provider:  CT scan of the right hip showing a right impacted femoral neck fracture nondisplaced     Pt has acute injury with risk of severe bodily function with their injury.              Patient has a impacted right femoral neck femur fracture meeting surgical indications for stabilization.  We will proceed with open reduction internal fixation .  Patient and family understand risks best procedure stated below including avascular necrosis.  This will allow immediate weight-bearing and excellent pain control.  We will get the patient to surgery within 24 hour window.   I explained that surgery and the nature of their condition are not without risks. These include, but  are not limited to, bleeding, infection, neurovascular compromise, malunion, nonunion, hardware complications, wound complications, scarring, cosmetic defects, need for later and/or repeated surgeries, pain, loss of ROM, loss of function, PTOA, deformity, stance/gait and/or functional abnormalities, thromboembolic complications, compartment syndrome, loss of limb, loss of life, anesthetic complications, and other imponderables. I explained that these can occur despite the adequacy of treatments rendered, and that their risks are heightened given the nature of their condition. They verbalized understanding. They would like to continue with surgery at this time. If appropriate family was involved with surgical discussion.           This note/OR report was created with the assistance of  voice recognition software or phone  dictation.  There may be transcription errors as a result of using this technology however minimal. Effort has been made to assure accuracy of transcription but any obvious errors or omissions should be clarified with the author of the document.       Art Agudelo, DO   Orthopedic Trauma Surgery  [facility] - 8th Floor Med Surg

## 2024-12-26 NOTE — TRANSFER OF CARE
"Anesthesia Transfer of Care Note    Patient: Trever Del Toro    Procedure(s) Performed: Procedure(s) (LRB):  PINNING, HIP, PERCUTANEOUS (Right)    Patient location: PACU    Anesthesia Type: general    Transport from OR: Transported from OR on room air with adequate spontaneous ventilation    Post pain: adequate analgesia    Post assessment: no apparent anesthetic complications    Post vital signs: stable    Level of consciousness: sedated and responds to stimulation    Nausea/Vomiting: no nausea/vomiting    Complications: none    Transfer of care protocol was followed      Last vitals: Visit Vitals  BP (!) 95/54   Pulse 72   Temp 36 °C (96.8 °F)   Resp 17   Ht 5' 2" (1.575 m)   Wt 58.1 kg (128 lb)   SpO2 95%   BMI 23.41 kg/m²     "

## 2024-12-26 NOTE — PROGRESS NOTES
[facility] Hospital Medicine Progress Note        Chief Complaint: Inpatient Follow-up for     HPI: 84 yr old female whose history includes CKD 3A and hypothyroidism. At the time of my exam patient is AAO x 3. Family at bedside. Presented to ED following a fall at home. Reports she was wearing slippery shoes and when she went to turn around she fell on right side. Following fall she was able to stand with assistance but unable to bear weight on RLE secondary to pain. Denies syncope. At baseline she lives at home alone and is independent of ADLs. Comfortable at time of my exam and pain well controlled.      VS on arrival: T 98.5, P 75, R 20, B/P 113/64, Sats 95% on room air. Initial labs include BUN 23, creatinine 1.55 and otherwise unremarkable. CT RLE shows subcapital right hip fracture with mild impaction. CT head and C-spine without acute findings. EKG sinus rhythm with no ischemic changes.      Interval Hx:   Patient seen and examined this morning in PACU patient is status post closed reduction and percutaneous pinning  Case was discussed with patient's nurse and  on the floor.    Objective/physical exam:  General: In no acute distress, afebrile  Chest: Clear to auscultation bilaterally  Heart: RRR, +S1, S2, no appreciable murmur  Abdomen: Soft, nontender, BS +  MSK: Warm, no lower extremity edema, no clubbing or cyanosis  Neurologic:  Sleepy but opens her eyes to verbal stimuli denied any complaints  VITAL SIGNS: 24 HRS MIN & MAX LAST   Temp  Min: 97.8 °F (36.6 °C)  Max: 98.5 °F (36.9 °C) 98.1 °F (36.7 °C)   BP  Min: 111/79  Max: 163/75 (!) 148/86   Pulse  Min: 63  Max: 77  63   Resp  Min: 15  Max: 25 16   SpO2  Min: 92 %  Max: 97 % 95 %     I have reviewed the following labs:  Recent Labs   Lab 12/25/24  1642 12/26/24  0501   WBC 7.26 6.68   RBC 3.74* 3.70*   HGB 11.0* 10.8*   HCT 33.0* 32.5*   MCV 88.2 87.8   MCH 29.4 29.2   MCHC 33.3 33.2   RDW 12.1 12.1     195   MPV 9.3 9.4     Recent Labs   Lab 12/25/24  1642      K 4.4      CO2 27   BUN 23.2*   CREATININE 1.55*   CALCIUM 8.8   ALBUMIN 3.7   ALKPHOS 56   ALT 12   AST 26   BILITOT 0.5     Microbiology Results (last 7 days)       ** No results found for the last 168 hours. **             See below for Radiology    Assessment/Plan:  Right femoral neck fracture status post closed reduction and percutaneous pinning of the right femoral neck fracture  Acute kidney injury on CKD 3A   Hypothyroidism   Depression  Anxiety    On 2 L of nasal cannula   Status post surgery's seen in PACU   Kidney function is improving so morning creatinine is 1.17  Will reduce the rate of IV fluids to 50 cc an hour  Will see how patient is doing with PT and OT postoperatively   Will see how patient's pain control is accordingly we will make discharge disposition   Orthopedics following   Repeat blood work in a.m.  VTE prophylaxis:  Lovenox    Patient condition:  Stable/Fair/Guarded/ Serious/ Critical    Anticipated discharge and Disposition:         All diagnosis and differential diagnosis have been reviewed; assessment and plan has been documented; I have personally reviewed the labs and test results that are presently available; I have reviewed the patients medication list; I have reviewed the consulting providers response and recommendations. I have reviewed or attempted to review medical records based upon their availability    All of the patient's questions have been  addressed and answered. Patient's is agreeable to the above stated plan. I will continue to monitor closely and make adjustments to medical management as needed.    Portions of this note dictated using EMR integrated voice recognition software, and may be subject to voice recognition errors not corrected at proofreading. Please contact writer for clarification if needed.   _____________________________________________________________________    Malnutrition  Status:  Nutrition consulted. Most recent weight and BMI monitored-     Measurements:  Wt Readings from Last 1 Encounters:   12/26/24 58.1 kg (128 lb)   Body mass index is 23.41 kg/m².    Patient has been screened and assessed by RD.    Malnutrition Type:  Context:    Level:      Malnutrition Characteristic Summary:       Interventions/Recommendations (treatment strategy):        Scheduled Med:   [START ON 12/27/2024] EScitalopram oxalate  10 mg Oral Daily    [START ON 12/27/2024] levothyroxine  50 mcg Oral Before breakfast    methocarbamoL  500 mg Oral TID    [START ON 12/27/2024] pravastatin  20 mg Oral Daily      Continuous Infusions:   lactated ringers   Intravenous Continuous 75 mL/hr at 12/25/24 2021 New Bag at 12/25/24 2021      PRN Meds:    Current Facility-Administered Medications:     acetaminophen, 1,000 mg, Oral, Q6H PRN    aluminum-magnesium hydroxide-simethicone, 30 mL, Oral, QID PRN    bisacodyL, 10 mg, Rectal, Daily PRN    dextrose 50%, 12.5 g, Intravenous, PRN    dextrose 50%, 25 g, Intravenous, PRN    glucagon (human recombinant), 1 mg, Intramuscular, PRN    glucose, 16 g, Oral, PRN    glucose, 24 g, Oral, PRN    melatonin, 6 mg, Oral, Nightly PRN    morphine, 4 mg, Intravenous, Once PRN    naloxone, 0.02 mg, Intravenous, PRN    ondansetron, 4 mg, Intravenous, Once PRN    ondansetron, 4 mg, Intravenous, Q4H PRN    prochlorperazine, 5 mg, Intravenous, Q6H PRN    senna-docusate 8.6-50 mg, 1 tablet, Oral, BID PRN    sodium chloride 0.9%, 10 mL, Intravenous, PRN     Radiology:  I have personally reviewed the following imaging and agree with the radiologist.     X-Ray Hip 2 or 3 views Right with Pelvis when performed  Addendum: EXAMINATION:   XR HIP WITH PELVIS WHEN PERFORMED 2 OR 3 VIEWS RIGHT     CLINICAL HISTORY:   Fall.     COMPARISON:   None available.     FINDINGS:   There is right femoral subcapital subtle fracture line without  displacement or angulation.  Femoral head is situated within the   acetabulum.     Right femoral subcapital fracture.  Please further confirm with CT of the  right hip.     Electronically signed by: Nicholas Palmer   Date:    12/25/2024   Time:    16:50  Narrative: EXAMINATION:  XR HIP WITH PELVIS WHEN PERFORMED 2 OR 3 VIEWS RIGHT    CLINICAL HISTORY:  Fall.    COMPARISON:  None available.    FINDINGS:  There is subtle fracture line of the right femoral neck without significant displacement or angulation.  Femoral head is situated within the acetabulum.  Impression: Right femur subtle transcervical fracture.  Please further confirm with CT of the right hip.    Electronically signed by: Nicholas Palmer  Date:    12/25/2024  Time:    16:25  CT Cervical Spine Without Contrast  Narrative: EXAMINATION:  CT CERVICAL SPINE WITHOUT CONTRAST    CLINICAL HISTORY:  Trauma.    TECHNIQUE:  Multidetector axial images were performed of the cervical spine without and.  Images were reconstructed.    Automated exposure control was utilized to minimize radiation dose.  DLP 1248.    COMPARISON:  March 13, 2022    FINDINGS:  Cervical vertebrae stature is maintained.  There is similar grade 1 anterolisthesis of C6 on C7.  Right aspect of C7 vertebral body osteolytic abnormality with internal septations is again similar likely is benign.  No acute fracture or malalignment identified.  There are multilevel degenerative changes which cause mild narrowing of the left neural foramen at C3-C4, moderate narrowing of the left neural foramen at C4-C5, marked right and moderate narrowing of the left neural foramen at C5-C6 and mild narrowing of the left neural foramen at C6-C7.  There is no prevertebral soft tissue prominence.    This study does not exclude the possibility of intrathecal soft tissue, ligamentous or vascular injury.  Impression: No acute fracture or malalignment identified.    Degenerative changes.    Electronically signed by: Nicholas Palmer  Date:    12/25/2024  Time:    16:48  CT Hip Without Contrast  Right  Narrative: EXAMINATION:  CT HIP WITHOUT CONTRAST RIGHT    CLINICAL HISTORY:  Fracture, hip;    TECHNIQUE:  Helical acquisition through the right hip without IV contrast.  Three plane reconstructions were provided for review.  mGycm. Automatic exposure control, adjustment of mA/kV or iterative reconstruction technique was used to reduce radiation.    COMPARISON:  Radiographs earlier today  Impression: There is subcapital right hip fracture with mild impaction.  The imaged bony pelvis is intact.  Within the pelvis there is no free fluid or mass.  Moderate stool in the colon.  There is colonic diverticulosis.  Moderate atherosclerotic disease.    Electronically signed by: Chay Amin  Date:    12/25/2024  Time:    16:43  CT Head Without Contrast  Narrative: EXAMINATION:  CT HEAD WITHOUT CONTRAST    CLINICAL HISTORY:  Head trauma, minor (Age >= 65y);    TECHNIQUE:  Sequential axial images were performed of the brain without contrast.    Dose product length of 1248 mGycm. Automated exposure control was utilized to minimize radiation dose.    COMPARISON:  .    FINDINGS:  There is no intracranial mass effect, midline shift, hydrocephalus or hemorrhage. There is no sulcal effacement or low attenuation changes to suggest recent large vessel territory infarction. Chronic appearing periventricular and subcortical white matter low attenuation changes are present and are consistent with chronic microangiopathic ischemia. The ventricular system and sulcal markings prominence is consistent with atrophy. There is no acute extra axial fluid collection.  There is no acute depressed skull fracture.  Bilateral mild mucoperiosteal thickening of the maxillary sinuses.  Otherwise, visualized paranasal sinuses are clear without mucosal thickening, polypoidal abnormality or air-fluid levels. Mastoid air cells aeration is optimal.  Impression: No acute intracranial findings identified.    Electronically signed by: Nicholas  Estela  Date:    12/25/2024  Time:    16:42      Jasmine De La Fuente MD  Department of Hospital Medicine   [facility]   12/26/2024

## 2024-12-26 NOTE — PLAN OF CARE
12/26/24 1407   Discharge Assessment   Assessment Type Discharge Planning Assessment   Confirmed/corrected address, phone number and insurance Yes   Confirmed Demographics Correct on Facesheet   Source of Information patient   Communicated JOSS with patient/caregiver Date not available/Unable to determine   Reason For Admission R subcapital femoral neck fracture   People in Home alone   Do you expect to return to your current living situation? Yes   Do you have help at home or someone to help you manage your care at home? No   Prior to hospitilization cognitive status: Alert/Oriented   Current cognitive status: Alert/Oriented   Walking or Climbing Stairs Difficulty no   Dressing/Bathing Difficulty no   Home Accessibility stairs to enter home   Number of Stairs, Main Entrance none   Stair Railings, Main Entrance none   Equipment Currently Used at Home none   Patient currently being followed by outpatient case management? No   Do you currently have service(s) that help you manage your care at home? No   Do you take prescription medications? Yes   Do you have prescription coverage? Yes   Coverage [facility]   Do you have any problems affording any of your prescribed medications? No   Is the patient taking medications as prescribed? yes   Who is going to help you get home at discharge? family   How do you get to doctors appointments? car, drives self   Are you on dialysis? No   Do you take coumadin? No   Discharge Plan A Other  (TBD)   Discharge Plan B Other  (TBD)   DME Needed Upon Discharge    (TBD)   Discharge Plan discussed with: Patient  (adopted grandchild)   Transition of Care Barriers None     Pt lives alone. She has 2 sons that live in [place]. Jarred Packer, pt's adopted grandchild is in the room. Her 2 sons are on their way in to see pt and one will arrive tomorrow. Pt uses [facility]. If pt requires inpt rehab at discharge she would like to use [facility] off of [facility].

## 2024-12-26 NOTE — ANESTHESIA PREPROCEDURE EVALUATION
"                                                                                                             12/26/2024  Trever Del Toro is a 84 y.o., female with   -------------------------------------    Anticoagulant long-term use    asa 81mg    High cholesterol    Stroke    TIA many years ago // no deficeit    Thyroid disease     And   ----------------------------    Dilation and curettage of uterus    Gallbladder surgery    Hysterectomy    Repair of rectocele    Revision of pubovaginal sling    Procedure: REVISION, PUBOVAGINAL SLING;  Surgeon: Evin Miguel MD;  Location: [facility] OR [address] [place] [address];  Service: Urology;  Laterality: N/A;  1hr    Thyroid surgery    Tonsillectomy       Presents for right hip percutaneous pinning     "84 yr old female whose history includes CKD 3A and hypothyroidism. At the time of my exam patient is AAO x 3. Family at bedside. Presented to ED following a fall at home. Reports she was wearing slippery shoes and when she went to turn around she fell on right side. Following fall she was able to stand with assistance but unable to bear weight on RLE secondary to pain. Denies syncope. At baseline she lives at home alone and is independent of ADLs. Comfortable at time of my exam and pain well controlled.      VS on arrival: T 98.5, P 75, R 20, B/P 113/64, Sats 95% on room air. Initial labs include BUN 23, creatinine 1.55 and otherwise unremarkable. CT RLE shows subcapital right hip fracture with mild impaction. CT head and C-spine without acute findings. EKG sinus rhythm with no ischemic changes. "     Pre-op Assessment    I have reviewed the NPO Status.      Review of Systems  Renal/:  Chronic Renal Disease, CKD        Kidney Function/Disease             Neurological:  TIA,                        CVA - Cerebrovasular Accident                 Endocrine:   Hypothyroidism       Hypothyroidism          Psych:  Psychiatric History                 Latest Reference Range & Units 12/26/24 05:01 "   WBC 4.50 - 11.50 x10(3)/mcL 6.68   Hemoglobin 12.0 - 16.0 g/dL 10.8 (L)   Hematocrit 37.0 - 47.0 % 32.5 (L)   Platelet Count 130 - 400 x10(3)/mcL 195   Sodium 136 - 145 mmol/L 140   Potassium 3.5 - 5.1 mmol/L 4.0   Chloride 98 - 107 mmol/L 105   CO2 23 - 31 mmol/L 27   Anion Gap mEq/L 8.0   BUN 9.8 - 20.1 mg/dL 23.8 (H)   Creatinine 0.55 - 1.02 mg/dL 1.17 (H)   BUN/CREAT RATIO  20   eGFR mL/min/1.73/m2 46   Glucose 82 - 115 mg/dL 103   (L): Data is abnormally low  (H): Data is abnormally high    Physical Exam  General: Well nourished, Cooperative and Alert    Airway:  Mallampati: II   Mouth Opening: Normal  TM Distance: Normal  Tongue: Normal    Dental:  Intact    Chest/Lungs:  Normal Respiratory Rate    Heart:  Rate: Normal        Anesthesia Plan  Type of Anesthesia, risks & benefits discussed:    Anesthesia Type: Gen ETT  Intra-op Monitoring Plan: Standard ASA Monitors  Post Op Pain Control Plan: multimodal analgesia  Induction:  IV  Airway Plan: Direct  Informed Consent: Informed consent signed with the Patient and all parties understand the risks and agree with anesthesia plan.  All questions answered. Patient consented to blood products? No  ASA Score: 3  Day of Surgery Review of History & Physical: H&P Update referred to the surgeon/provider.    Ready For Surgery From Anesthesia Perspective.     .

## 2024-12-27 LAB
ALBUMIN SERPL-MCNC: 3.1 G/DL (ref 3.4–4.8)
ALBUMIN/GLOB SERPL: 1.6 RATIO (ref 1.1–2)
ALP SERPL-CCNC: 49 UNIT/L (ref 40–150)
ALT SERPL-CCNC: 9 UNIT/L (ref 0–55)
ANION GAP SERPL CALC-SCNC: 4 MEQ/L
AST SERPL-CCNC: 18 UNIT/L (ref 5–34)
BASOPHILS # BLD AUTO: 0.02 X10(3)/MCL
BASOPHILS NFR BLD AUTO: 0.2 %
BILIRUB SERPL-MCNC: 0.4 MG/DL
BUN SERPL-MCNC: 16.1 MG/DL (ref 9.8–20.1)
CALCIUM SERPL-MCNC: 8.3 MG/DL (ref 8.4–10.2)
CHLORIDE SERPL-SCNC: 107 MMOL/L (ref 98–107)
CO2 SERPL-SCNC: 27 MMOL/L (ref 23–31)
CREAT SERPL-MCNC: 1.16 MG/DL (ref 0.55–1.02)
CREAT/UREA NIT SERPL: 14
EOSINOPHIL # BLD AUTO: 0.06 X10(3)/MCL (ref 0–0.9)
EOSINOPHIL NFR BLD AUTO: 0.7 %
ERYTHROCYTE [DISTWIDTH] IN BLOOD BY AUTOMATED COUNT: 12.2 % (ref 11.5–17)
GFR SERPLBLD CREATININE-BSD FMLA CKD-EPI: 46 ML/MIN/1.73/M2
GLOBULIN SER-MCNC: 2 GM/DL (ref 2.4–3.5)
GLUCOSE SERPL-MCNC: 112 MG/DL (ref 82–115)
HCT VFR BLD AUTO: 29.9 % (ref 37–47)
HGB BLD-MCNC: 9.8 G/DL (ref 12–16)
IMM GRANULOCYTES # BLD AUTO: 0.03 X10(3)/MCL (ref 0–0.04)
IMM GRANULOCYTES NFR BLD AUTO: 0.4 %
LYMPHOCYTES # BLD AUTO: 1.03 X10(3)/MCL (ref 0.6–4.6)
LYMPHOCYTES NFR BLD AUTO: 12.5 %
MCH RBC QN AUTO: 29.2 PG (ref 27–31)
MCHC RBC AUTO-ENTMCNC: 32.8 G/DL (ref 33–36)
MCV RBC AUTO: 89 FL (ref 80–94)
MONOCYTES # BLD AUTO: 0.53 X10(3)/MCL (ref 0.1–1.3)
MONOCYTES NFR BLD AUTO: 6.4 %
NEUTROPHILS # BLD AUTO: 6.56 X10(3)/MCL (ref 2.1–9.2)
NEUTROPHILS NFR BLD AUTO: 79.8 %
NRBC BLD AUTO-RTO: 0 %
PLATELET # BLD AUTO: 183 X10(3)/MCL (ref 130–400)
PMV BLD AUTO: 9.9 FL (ref 7.4–10.4)
POTASSIUM SERPL-SCNC: 4.1 MMOL/L (ref 3.5–5.1)
PROT SERPL-MCNC: 5.1 GM/DL (ref 5.8–7.6)
RBC # BLD AUTO: 3.36 X10(6)/MCL (ref 4.2–5.4)
SODIUM SERPL-SCNC: 138 MMOL/L (ref 136–145)
WBC # BLD AUTO: 8.23 X10(3)/MCL (ref 4.5–11.5)

## 2024-12-27 PROCEDURE — 97166 OT EVAL MOD COMPLEX 45 MIN: CPT

## 2024-12-27 PROCEDURE — 94799 UNLISTED PULMONARY SVC/PX: CPT

## 2024-12-27 PROCEDURE — 11000001 HC ACUTE MED/SURG PRIVATE ROOM

## 2024-12-27 PROCEDURE — 85025 COMPLETE CBC W/AUTO DIFF WBC: CPT | Performed by: PHYSICIAN ASSISTANT

## 2024-12-27 PROCEDURE — 80053 COMPREHEN METABOLIC PANEL: CPT | Performed by: PHYSICIAN ASSISTANT

## 2024-12-27 PROCEDURE — 63600175 PHARM REV CODE 636 W HCPCS: Performed by: INTERNAL MEDICINE

## 2024-12-27 PROCEDURE — 63600175 PHARM REV CODE 636 W HCPCS: Performed by: PHYSICIAN ASSISTANT

## 2024-12-27 PROCEDURE — 99900035 HC TECH TIME PER 15 MIN (STAT)

## 2024-12-27 PROCEDURE — 25000003 PHARM REV CODE 250: Performed by: NURSE PRACTITIONER

## 2024-12-27 PROCEDURE — 27000221 HC OXYGEN, UP TO 24 HOURS

## 2024-12-27 PROCEDURE — 97162 PT EVAL MOD COMPLEX 30 MIN: CPT

## 2024-12-27 PROCEDURE — 36415 COLL VENOUS BLD VENIPUNCTURE: CPT | Performed by: PHYSICIAN ASSISTANT

## 2024-12-27 PROCEDURE — 25000003 PHARM REV CODE 250: Performed by: PHYSICIAN ASSISTANT

## 2024-12-27 PROCEDURE — 99900031 HC PATIENT EDUCATION (STAT)

## 2024-12-27 RX ORDER — PANTOPRAZOLE SODIUM 40 MG/1
40 TABLET, DELAYED RELEASE ORAL DAILY
Status: DISCONTINUED | OUTPATIENT
Start: 2024-12-27 | End: 2024-12-30 | Stop reason: HOSPADM

## 2024-12-27 RX ADMIN — LEVOTHYROXINE SODIUM 50 MCG: 0.05 TABLET ORAL at 05:12

## 2024-12-27 RX ADMIN — MUPIROCIN: 20 OINTMENT TOPICAL at 09:12

## 2024-12-27 RX ADMIN — OXYCODONE HYDROCHLORIDE 5 MG: 5 TABLET ORAL at 06:12

## 2024-12-27 RX ADMIN — CEFAZOLIN 2 G: 2 INJECTION, POWDER, FOR SOLUTION INTRAMUSCULAR; INTRAVENOUS at 09:12

## 2024-12-27 RX ADMIN — ENOXAPARIN SODIUM 30 MG: 30 INJECTION SUBCUTANEOUS at 05:12

## 2024-12-27 RX ADMIN — METHOCARBAMOL 500 MG: 500 TABLET ORAL at 09:12

## 2024-12-27 RX ADMIN — METHOCARBAMOL 500 MG: 500 TABLET ORAL at 02:12

## 2024-12-27 RX ADMIN — SODIUM CHLORIDE, SODIUM LACTATE, POTASSIUM CHLORIDE, CALCIUM CHLORIDE: 20; 30; 600; 310 INJECTION, SOLUTION INTRAVENOUS at 09:12

## 2024-12-27 RX ADMIN — ESCITALOPRAM OXALATE 10 MG: 10 TABLET ORAL at 09:12

## 2024-12-27 RX ADMIN — PRAVASTATIN SODIUM 20 MG: 10 TABLET ORAL at 09:12

## 2024-12-27 NOTE — ANESTHESIA POSTPROCEDURE EVALUATION
Anesthesia Post Evaluation    Patient: Trever Del Toro    Procedure(s) Performed: Procedure(s) (LRB):  PINNING, HIP, PERCUTANEOUS (Right)    Final Anesthesia Type: general      Patient location during evaluation: PACU  Patient participation: Yes- Able to Participate  Level of consciousness: awake and alert  Post-procedure vital signs: reviewed and stable  Pain management: adequate  Airway patency: patent    PONV status at discharge: No PONV  Anesthetic complications: no      Respiratory status: unassisted  Hydration status: euvolemic  Follow-up not needed.              Vitals Value Taken Time   /87 12/27/24 1125   Temp 36.6 °C (97.8 °F) 12/27/24 1125   Pulse 75 12/27/24 1125   Resp 18 12/27/24 1125   SpO2 93 % 12/27/24 1125         Event Time   Out of Recovery 12/26/2024 11:30:00         Pain/Waldemar Score: Waldemar Score: 9 (12/26/2024 11:30 AM)

## 2024-12-27 NOTE — PT/OT/SLP EVAL
Physical Therapy Evaluation    Patient Name:  Trever Del Toro   MRN:  [id number]    Recommendations:     Discharge therapy intensity: Low Intensity Therapy (with assist from son)   Discharge Equipment Recommendations: none   Barriers to discharge: Impaired mobility    Assessment:     Trever Del Toro is a 84 y.o. female admitted with a medical diagnosis of R femoral neck fx 2/2 mechanical fall, s/p closed reduction and pinning.  She presents with the following impairments/functional limitations: weakness, gait instability, impaired endurance, impaired balance, decreased safety awareness, impaired functional mobility. The pt tolerated session well. She is able to mobilize with CGA and RW. Pt is safe to dc home with son and LOW intensity PT.    Rehab Prognosis: Good; patient would benefit from acute skilled PT services to address these deficits and reach maximum level of function.    Recent Surgery: Procedure(s) (LRB):  PINNING, HIP, PERCUTANEOUS (Right) 1 Day Post-Op    Plan:     During this hospitalization, patient would benefit from acute PT services 6 x/week to address the identified rehab impairments via gait training, therapeutic activities, therapeutic exercises and progress toward the following goals:    Plan of Care Expires:  01/24/25    Subjective     Chief Complaint: none  Patient/Family Comments/goals: return to PLOF  Pain/Comfort:  Pain Rating 1: 9/10  Location - Side 1: Right  Location 1: hip  Pain Addressed 1: Reposition, Distraction    Patients cultural, spiritual, Amish conflicts given the current situation:      Living Environment:  Home alone, Sl home, no steps to enter.  Prior to admission, patients level of function was independent.  Equipment used at home: none.  DME owned (not currently used): none.  Upon discharge, patient will have assistance from son.    Objective:     Communicated with NSG prior to session.  Patient found supine with    upon PT entry to room.    General Precautions:  Standard, fall  Orthopedic Precautions:RLE weight bearing as tolerated   Braces: N/A  Respiratory Status: Room air  Blood Pressure: NA      Exams:  RLE ROM: WFL  RLE Strength: WFL  LLE ROM: WFL  LLE Strength: WFL  Skin integrity: Visible skin intact      Functional Mobility:  Bed Mobility:     Scooting: contact guard assistance  Supine to Sit: contact guard assistance  Sit to Supine: contact guard assistance  Transfers:     Sit to Stand:  contact guard assistance with rolling walker  Gait: pt ambulates x 10 feet, 5 feet with CGA and RW.    Patient provided with verbal education education regarding PT role/goals/POC, safety awareness, and discharge/DME recommendations.  Understanding was verbalized.     Patient left supine with all lines intact, call button in reach, and NSG notified.    GOALS:   Multidisciplinary Problems       Physical Therapy Goals          Problem: Physical Therapy    Goal Priority Disciplines Outcome Interventions   Physical Therapy Goal     PT, PT/OT Progressing    Description: Goals to be met by: 25     Patient will increase functional independence with mobility by performin. Supine to sit with Mod I.  2. Sit to supine with Mod I.  3. Sit to stand transfer with Mod I.  4. Bed to chair transfer with Mod I with no AD.  5. Gait  x 200 feet with Mod I using no AD.                         History:     Past Medical History:   Diagnosis Date    Anticoagulant long-term use     asa 81mg    High cholesterol     Stroke     TIA many years ago // no deficeit    Thyroid disease        Past Surgical History:   Procedure Laterality Date    DILATION AND CURETTAGE OF UTERUS      GALLBLADDER SURGERY      HYSTERECTOMY      PERCUTANEOUS PINNING OF HIP Right 2024    Procedure: PINNING, HIP, PERCUTANEOUS;  Surgeon: Art Agudelo DO;  Location: [facility];  Service: Orthopedics;  Laterality: Right;  supine hana DeepDyve synthes FNS c arm    REPAIR OF RECTOCELE      REVISION OF PUBOVAGINAL SLING N/A  05/04/2021    Procedure: REVISION, PUBOVAGINAL SLING;  Surgeon: Evin Miguel MD;  Location: [facility] OR [address] [place] [address];  Service: Urology;  Laterality: N/A;  1hr    THYROID SURGERY Right     TONSILLECTOMY         Time Tracking:     PT Received On: 12/27/24  PT Start Time: 1051     PT Stop Time: 1109  PT Total Time (min): 18 min     Billable Minutes: Evaluation 18      12/27/2024

## 2024-12-27 NOTE — PROGRESS NOTES
[facility] Hospital Medicine Progress Note        Chief Complaint: Inpatient Follow-up for     HPI: 84 yr old female whose history includes CKD 3A and hypothyroidism. At the time of my exam patient is AAO x 3. Family at bedside. Presented to ED following a fall at home. Reports she was wearing slippery shoes and when she went to turn around she fell on right side. Following fall she was able to stand with assistance but unable to bear weight on RLE secondary to pain. Denies syncope. At baseline she lives at home alone and is independent of ADLs. Comfortable at time of my exam and pain well controlled.      VS on arrival: T 98.5, P 75, R 20, B/P 113/64, Sats 95% on room air. Initial labs include BUN 23, creatinine 1.55 and otherwise unremarkable. CT RLE shows subcapital right hip fracture with mild impaction. CT head and C-spine without acute findings. EKG sinus rhythm with no ischemic changes.    patient is status post closed reduction and percutaneous pinning     Interval Hx:   Patient seen and examined this morning denied any pain patient is requesting to go wound as her son will be visiting and will be able to stay with her  Objective/physical exam:  General: In no acute distress, afebrile  Chest: Clear to auscultation bilaterally  Heart: RRR, +S1, S2, no appreciable murmur  Abdomen: Soft, nontender, BS +  MSK: Warm, no lower extremity edema, no clubbing or cyanosis  Neurologic:  Awake and alert  VITAL SIGNS: 24 HRS MIN & MAX LAST   Temp  Min: 96.8 °F (36 °C)  Max: 98.9 °F (37.2 °C) 98.9 °F (37.2 °C)   BP  Min: 95/54  Max: 174/65 117/69   Pulse  Min: 60  Max: 85  64   Resp  Min: 16  Max: 22 16   SpO2  Min: 87 %  Max: 98 % (!) 91 %     I have reviewed the following labs:  Recent Labs   Lab 12/25/24  1642 12/26/24  0501 12/27/24  0400   WBC 7.26 6.68 8.23   RBC 3.74* 3.70* 3.36*   HGB 11.0* 10.8* 9.8*   HCT 33.0* 32.5* 29.9*   MCV 88.2 87.8 89.0   MCH 29.4 29.2 29.2   MCHC 33.3 33.2  32.8*   RDW 12.1 12.1 12.2    195 183   MPV 9.3 9.4 9.9     Recent Labs   Lab 12/25/24  1642 12/26/24  0501 12/27/24  0400    140 138   K 4.4 4.0 4.1    105 107   CO2 27 27 27   BUN 23.2* 23.8* 16.1   CREATININE 1.55* 1.17* 1.16*   CALCIUM 8.8 8.7 8.3*   MG  --  2.10  --    ALBUMIN 3.7  --  3.1*   ALKPHOS 56  --  49   ALT 12  --  9   AST 26  --  18   BILITOT 0.5  --  0.4     Microbiology Results (last 7 days)       ** No results found for the last 168 hours. **             See below for Radiology    Assessment/Plan:  Right femoral neck fracture status post closed reduction and percutaneous pinning of the right femoral neck fracture  Acute kidney injury on CKD 3A   Hypothyroidism   Depression  Anxiety    We will see how patient is doing with PT and what their recommendations are if they are okay with home health and PT then we might discharge her home if okay with Orthopedics   Creatinine is almost back to baseline will stop the IV fluids   Continue with pain control, orthopedics following   Hemoglobin of 9.8   Creatinine of 1.16      Awaiting PT and OT recommendations  VTE prophylaxis:  Lovenox    Patient condition:  Stable/Fair/Guarded/ Serious/ Critical    Anticipated discharge and Disposition:         All diagnosis and differential diagnosis have been reviewed; assessment and plan has been documented; I have personally reviewed the labs and test results that are presently available; I have reviewed the patients medication list; I have reviewed the consulting providers response and recommendations. I have reviewed or attempted to review medical records based upon their availability    All of the patient's questions have been  addressed and answered. Patient's is agreeable to the above stated plan. I will continue to monitor closely and make adjustments to medical management as needed.    Portions of this note dictated using EMR integrated voice recognition software, and may be subject to voice  recognition errors not corrected at proofreading. Please contact writer for clarification if needed.   _____________________________________________________________________    Malnutrition Status:  Nutrition consulted. Most recent weight and BMI monitored-     Measurements:  Wt Readings from Last 1 Encounters:   12/26/24 58.1 kg (128 lb)   Body mass index is 23.41 kg/m².    Patient has been screened and assessed by RD.    Malnutrition Type:  Context:    Level:      Malnutrition Characteristic Summary:       Interventions/Recommendations (treatment strategy):        Scheduled Med:   ceFAZolin (Ancef) IV (PEDS and ADULTS)  2 g Intravenous Q12H    enoxaparin  30 mg Subcutaneous Daily    EScitalopram oxalate  10 mg Oral Daily    levothyroxine  50 mcg Oral Before breakfast    methocarbamoL  500 mg Oral TID    mupirocin   Nasal BID    polyethylene glycol  17 g Oral Daily    pravastatin  20 mg Oral Daily      Continuous Infusions:   lactated ringers   Intravenous Continuous 50 mL/hr at 12/26/24 1504 Rate Change at 12/26/24 1504      PRN Meds:    Current Facility-Administered Medications:     acetaminophen, 1,000 mg, Oral, Q6H PRN    aluminum-magnesium hydroxide-simethicone, 30 mL, Oral, QID PRN    bisacodyL, 10 mg, Rectal, Daily PRN    dextrose 50%, 12.5 g, Intravenous, PRN    dextrose 50%, 25 g, Intravenous, PRN    glucagon (human recombinant), 1 mg, Intramuscular, PRN    glucose, 16 g, Oral, PRN    glucose, 24 g, Oral, PRN    melatonin, 6 mg, Oral, Nightly PRN    morphine, 4 mg, Intravenous, Once PRN    morphine, 4 mg, Intravenous, Q6H PRN    naloxone, 0.02 mg, Intravenous, PRN    ondansetron, 4 mg, Intravenous, Once PRN    ondansetron, 4 mg, Intravenous, Q4H PRN    oxyCODONE, 5 mg, Oral, Q6H PRN    oxyCODONE, 10 mg, Oral, Q4H PRN    prochlorperazine, 5 mg, Intravenous, Q6H PRN    senna-docusate 8.6-50 mg, 1 tablet, Oral, BID PRN    sodium chloride 0.9%, 10 mL, Intravenous, PRN     Radiology:  I have personally reviewed  the following imaging and agree with the radiologist.     X-Ray Hip 2 or 3 views Right with Pelvis when performed  Narrative: EXAMINATION:  XR HIP WITH PELVIS WHEN PERFORMED 2 OR 3 VIEWS RIGHT    CLINICAL HISTORY:  post op;    TECHNIQUE:  AP view of the pelvis and AP and frog leg lateral view of the right hip were performed.    COMPARISON:  None.    FINDINGS:  BONE: Postop right femur ORIF.  Hardware appears engaged and intact.  Alignment satisfactory.    SOFT TISSUES: Expected postoperative gas.  Impression: Postop ORIF without appreciable complication by plain radiographic evaluation    Electronically signed by: Rama Juarez  Date:    12/26/2024  Time:    12:03  SURG FL Surgery Fluoro Usage  See OP Notes for results.     IMPRESSION: See OP Notes for results.     This procedure was auto-finalized by: Virtual Radiologist  X-Ray Chest AP Portable  Narrative: EXAMINATION:  XR CHEST AP PORTABLE    CLINICAL HISTORY:  , Encounter for other preprocedural examination.    FINDINGS:  No alveolar consolidation, effusion, or pneumothorax is seen.   The thoracic aorta is normal  cardiac silhouette, central pulmonary vessels and mediastinum are normal in size and are grossly unremarkable.   visualized osseous structures are grossly unremarkable.  Impression: No acute chest disease is identified.    Electronically signed by: Anibal Dominique  Date:    12/26/2024  Time:    05:59      Jasmine De La Fuente MD  Department of Hospital Medicine   [facility]   12/27/2024

## 2024-12-27 NOTE — PT/OT/SLP EVAL
Occupational Therapy  Evaluation    Name: Trever Del Toro  MRN: [id number]  Admitting Diagnosis: Fall: R femoral neck fx s/p CRPP  Recent Surgery: Procedure(s) (LRB):  PINNING, HIP, PERCUTANEOUS (Right) 1 Day Post-Op    Recommendations:     Discharge therapy intensity: Low Intensity Therapy (c family assistance)   Discharge Equipment Recommendations:  walker, rolling  Barriers to discharge:  None    Assessment:     Trever Del Toro is a 84 y.o. female with a medical diagnosis of Fall: R femoral neck fx s/p CRPP. Pt is IND c ADLs and mobility at baseline. She presents with the following performance deficits affecting function: weakness, impaired endurance, orthopedic precautions, impaired self care skills, impaired functional mobility, impaired balance, pain. At time of evaluation, pt able to ambulate to bathroom using RW c CGA and complete toilet t/f c Min A. Pt reported her sons are coming in town from [place] and she would like to go home at d/c. She stated that they will be able to assist as needed. Recommend low intensity therapy c family assist at d/c.     Rehab Prognosis: Good; patient would benefit from acute skilled OT services to address these deficits and reach maximum level of function.       Plan:     Patient to be seen 6 x/week to address the above listed problems via self-care/home management, therapeutic activities, therapeutic exercises  Plan of Care Expires: 01/24/25  Plan of Care Reviewed with: patient, family    Subjective     Chief Complaint: Pain in RLE c mobility   Patient/Family Comments/goals: To go home     Occupational Profile:  Living Environment: Pt lives in a 2 story home. Reported bedroom and bathroom are on the first floor. There is a tub and a walk in shower c a built in seat in the bathroom.   Previous level of function: IND c ADLs and mobility without AD   Roles and Routines: IND and active   Equipment Used at Home: none  Assistance upon Discharge: Pt's sons     Pain/Comfort:  Pain Rating  1:  (0/10 at rest, 9/10 c mobility)  Location - Side 1: Right  Location 1: leg  Pain Addressed 1: Reposition, Distraction    Patients cultural, spiritual, Yazidism conflicts given the current situation: no    Objective:     OT communicated with RN prior to session.      Patient was found HOB elevated with burnette catheter, peripheral IV upon OT entry to room.    General Precautions: Standard, fall  Orthopedic Precautions: RLE weight bearing as tolerated (ROMAT)  Braces: N/A      Bed Mobility:    Patient completed Supine to Sit with contact guard assistance    Functional Mobility/Transfers:  Patient completed Sit <> Stand Transfer with minimum assistance  with  rolling walker   Patient completed Toilet Transfer Step Transfer technique with minimum assistance with  rolling walker  Functional Mobility: Pt ambulated to bathroom using RW c CGA    Activities of Daily Living:  Lower Body Dressing: maximal assistance    Toileting: total assistance burnette    AMPAC 6 Click ADL:  AMPAC Total Score: 21    Functional Cognition:  Intact    Upper Extremity Function:  Right Upper Extremity:   WFL    Left Upper Extremity:  WFL    Therapeutic Positioning  Risk for acquired pressure injuries is decreased due to ability to get to BSC/toilet with assist.    OT interventions performed during the course of today's session:   Education was provided on benefits of and recommendations for therapeutic positioning    Skin assessment: all bony prominences were assessed    Findings: no redness or breakdown noted    OT recommendations for therapeutic positioning throughout hospitalization:   Follow [facility] Pressure Injury Prevention Protocol      Patient Education:  Patient and family were provided with verbal education education regarding OT role/goals/POC, post op precautions, and Discharge/DME recommendations.  Understanding was verbalized.     Patient left up in chair with all lines intact and call button in reach.    GOALS:   Multidisciplinary  Problems       Occupational Therapy Goals          Problem: Occupational Therapy    Goal Priority Disciplines Outcome Interventions   Occupational Therapy Goal     OT, PT/OT Progressing    Description: LTG: Pt will perform basic ADLs and ADL transfers with Modified independence using LRAD by discharge.    STG: to be met by 1/24/25:    Pt will complete grooming standing at sink with LRAD with SBA.  Pt will complete UB dressing with SBA.  Pt will complete LB dressing with SBA using LRAD.  Pt will complete toileting with SBA using LRAD.  Pt will complete functional mobility to/from toilet and toilet transfer with SBA using LRAD.                        History:     Past Medical History:   Diagnosis Date    Anticoagulant long-term use     asa 81mg    High cholesterol     Stroke     TIA many years ago // no deficeit    Thyroid disease          Past Surgical History:   Procedure Laterality Date    DILATION AND CURETTAGE OF UTERUS      GALLBLADDER SURGERY      HYSTERECTOMY      PERCUTANEOUS PINNING OF HIP Right 12/26/2024    Procedure: PINNING, HIP, PERCUTANEOUS;  Surgeon: Art Agudelo DO;  Location: [facility];  Service: Orthopedics;  Laterality: Right;  supine hana table synthes FNS c arm    REPAIR OF RECTOCELE      REVISION OF PUBOVAGINAL SLING N/A 05/04/2021    Procedure: REVISION, PUBOVAGINAL SLING;  Surgeon: Evin Miguel MD;  Location: [facility] OR [address] [place] [address];  Service: Urology;  Laterality: N/A;  1hr    THYROID SURGERY Right     TONSILLECTOMY         Time Tracking:     OT Date of Treatment: 12/27/24  OT Start Time: 1050  OT Stop Time: 1109  OT Total Time (min): 19 min    Billable Minutes:Evaluation Moderate complexity     12/27/2024

## 2024-12-27 NOTE — PLAN OF CARE
Problem: Occupational Therapy  Goal: Occupational Therapy Goal  Description: LTG: Pt will perform basic ADLs and ADL transfers with Modified independence using LRAD by discharge.    STG: to be met by 1/24/25:    Pt will complete grooming standing at sink with LRAD with SBA.  Pt will complete UB dressing with SBA.  Pt will complete LB dressing with SBA using LRAD.  Pt will complete toileting with SBA using LRAD.  Pt will complete functional mobility to/from toilet and toilet transfer with SBA using LRAD.   Outcome: Progressing

## 2024-12-27 NOTE — PROGRESS NOTES
[facility] - 8th Floor Med Surg  Orthopedics  Progress Note    Patient Name: Trever Del Toro  MRN: [id number]  Admission Date: 12/25/2024  Hospital Length of Stay: 2 days  Attending Provider: Jasmine De La Fuente MD  Primary Care Provider: Rose Trujillo MD  Follow-up For: Procedure(s) (LRB):  PINNING, HIP, PERCUTANEOUS (Right)    Post-Operative Day: 1 Day Post-Op  Subjective:     Principal Problem:Closed fracture of neck of right femur    Principal Orthopedic Problem: 1 Day Post-Op   R hip FNS    Interval History: Patient resting comfortably. Doing well overall. Pain well controlled. No numbness or tingling distally. Eager for DC home as her family is in town. Lives alone but states she will have help from her son at discharge.     Review of patient's allergies indicates:   Allergen Reactions    Cephalexin Other (See Comments)    Amoxicillin-pot clavulanate Nausea And Vomiting    Codeine Nausea And Vomiting       Current Facility-Administered Medications   Medication    acetaminophen tablet 1,000 mg    aluminum-magnesium hydroxide-simethicone 200-200-20 mg/5 mL suspension 30 mL    bisacodyL suppository 10 mg    ceFAZolin 2 g    dextrose 50% injection 12.5 g    dextrose 50% injection 25 g    enoxaparin injection 30 mg    EScitalopram oxalate tablet 10 mg    glucagon (human recombinant) injection 1 mg    glucose chewable tablet 16 g    glucose chewable tablet 24 g    lactated ringers infusion    levothyroxine tablet 50 mcg    melatonin tablet 6 mg    methocarbamoL tablet 500 mg    morphine injection 4 mg    morphine injection 4 mg    mupirocin 2 % ointment    naloxone 0.4 mg/mL injection 0.02 mg    ondansetron injection 4 mg    ondansetron injection 4 mg    oxyCODONE immediate release tablet 5 mg    oxyCODONE immediate release tablet Tab 10 mg    polyethylene glycol packet 17 g    pravastatin tablet 20 mg    prochlorperazine injection Soln 5 mg    senna-docusate 8.6-50 mg per tablet 1 tablet    sodium  "chloride 0.9% flush 10 mL     Objective:     Vital Signs (Most Recent):  Temp: 98.2 °F (36.8 °C) (12/27/24 0733)  Pulse: 82 (12/27/24 0733)  Resp: 16 (12/27/24 0733)  BP: 135/77 (12/27/24 0733)  SpO2: (!) 89 % (12/27/24 0733) Vital Signs (24h Range):  Temp:  [96.8 °F (36 °C)-98.9 °F (37.2 °C)] 98.2 °F (36.8 °C)  Pulse:  [60-85] 82  Resp:  [16-22] 16  SpO2:  [87 %-98 %] 89 %  BP: ()/(54-92) 135/77     Weight: 58.1 kg (128 lb)  Height: 5' 2" (157.5 cm)  Body mass index is 23.41 kg/m².      Intake/Output Summary (Last 24 hours) at 12/27/2024 0815  Last data filed at 12/27/2024 0500  Gross per 24 hour   Intake 1859.37 ml   Output 2750 ml   Net -890.63 ml       Physical Exam:   General the patient is alert and oriented x3 no acute distress nontoxic-appearing appropriate affect.    Constitutional: Vital signs are examined and stable.  Resp: No signs of labored breathing                        RLE: -Skin: Dressing CDI with minimal shadowing over incision site           -MSK: : Hip and Knee F/E, EHL/FHL, Gastroc/Tib anterior Strength 5/5; tolerates full passive circumduction of the hip           -Neuro:  Sensation intact to light touch L3-S1 dermatomes           -Lymphatic: No signs of lymphadenopathy           -CV: Capillary refill is less than 2 seconds. Compartments soft. DP palpable        Diagnostic Findings:   Significant Labs:   Recent Lab Results  (Last 5 results in the past 72 hours)        12/27/24  0400   12/26/24  0501   12/25/24  1846   12/25/24  1841 12/25/24  1759        Albumin/Globulin Ratio 1.6               ABO and RH     B POS           Albumin 3.1               ALP 49               ALT 9               Anion Gap 4.0   8.0             AST 18               Baso # 0.02   0.03             Basophil % 0.2   0.4             BILIRUBIN TOTAL 0.4               BUN 16.1   23.8             BUN/CREAT RATIO 14   20             Calcium 8.3   8.7             Chloride 107   105             CO2 27   27             " Creatinine 1.16   1.17             eGFR 46   46             Eos # 0.06   0.23             Eos % 0.7   3.4             Globulin, Total 2.0               Glucose 112   103             Group & Rh         B POS       Hematocrit 29.9   32.5             Hemoglobin 9.8   10.8             Immature Grans (Abs) 0.03   0.02             Immature Granulocytes 0.4   0.3             Indirect Dalia GEL         NEG       Lymph # 1.03   1.11             LYMPH % 12.5   16.6             Magnesium    2.10             MCH 29.2   29.2             MCHC 32.8   33.2             MCV 89.0   87.8             Mono # 0.53   0.52             Mono % 6.4   7.8             MPV 9.9   9.4             Neut # 6.56   4.77             Neut % 79.8   71.5             nRBC 0.0   0.0             QRS Duration       70         [facility] QTC Calculation       421         Platelet Count 183   195             Potassium 4.1   4.0             PROTEIN TOTAL 5.1               RBC 3.36   3.70             RDW 12.2   12.1             Sodium 138   140             Specimen Outdate         12/28/2024 23:59       WBC 8.23   6.68                                     Significant Imaging: I have reviewed and interpreted all pertinent imaging results/findings.     Assessment/Plan:     Active Diagnoses:    Diagnosis Date Noted POA    PRINCIPAL PROBLEM:  Closed fracture of neck of right femur [S72.001A] 12/26/2024 Yes      Problems Resolved During this Admission:   POD 1 R hip FNS.   WBAT and ROMAT with PT. Work with them on mobilizing today.   Begin daily dry dressing changes POD2. May cover with soft dressing or large band aid. Keep clean and dry. No showers to POD 7. Do not submerge. Do not apply ointments or creams.  Continue multimodal pain control. Lovenox for DVT ppx during stay and aspirin 81 mgBID at discharge.   Will continue to follow through her stay.   Follow PT recs home with   vs rehab.   Follow up with Avis Santiago PA-C/ Dr. Art Agudelo in clinic in 3 weeks for  wound check and repeat evaluation.         The above findings, diagnostics, and treatment plan were discussed with Dr. Agudelo who is in agreement with the plan of care except as stated in additional documentation.      Avis Santiago PA-C  Orthopedic Trauma Surgery  [facility]

## 2024-12-27 NOTE — PLAN OF CARE
Problem: Physical Therapy  Goal: Physical Therapy Goal  Description: Goals to be met by: 25     Patient will increase functional independence with mobility by performin. Supine to sit with Mod I.  2. Sit to supine with Mod I.  3. Sit to stand transfer with Mod I.  4. Bed to chair transfer with Mod I with no AD.  5. Gait  x 200 feet with Mod I using no AD.    Outcome: Progressing

## 2024-12-27 NOTE — PLAN OF CARE
Consult for Rehab noted. Pt was adamant yesterday that she wanted to go to [facility] after discharge from here. Referral sent via Epic this morning.   Pt's granddaughter walked up to the nurses station to say pt wants to go home now since her son's are coming in. Will await therapy recommendations.    11:15pm-per therapy pt would be safe to go home with family and HH.    1:35pm-Patient choice list of HH agencies printed and given to pt with family in room.  They would like to look through the options and will get back to me. All questions answered.    2:10pm-Freedom of choice signed. Referral sent to [facility] via [employer].    3:40pm-Physical therapy recommended a RW. Pt said she borrowed a new RW from someone and does not need me to order her one.

## 2024-12-28 LAB
ALBUMIN SERPL-MCNC: 3.2 G/DL (ref 3.4–4.8)
ALBUMIN/GLOB SERPL: 1.3 RATIO (ref 1.1–2)
ALP SERPL-CCNC: 52 UNIT/L (ref 40–150)
ALT SERPL-CCNC: <5 UNIT/L (ref 0–55)
ANION GAP SERPL CALC-SCNC: 7 MEQ/L
AST SERPL-CCNC: 20 UNIT/L (ref 5–34)
BASOPHILS # BLD AUTO: 0.03 X10(3)/MCL
BASOPHILS NFR BLD AUTO: 0.4 %
BILIRUB SERPL-MCNC: 0.5 MG/DL
BUN SERPL-MCNC: 17 MG/DL (ref 9.8–20.1)
CALCIUM SERPL-MCNC: 8.3 MG/DL (ref 8.4–10.2)
CHLORIDE SERPL-SCNC: 104 MMOL/L (ref 98–107)
CO2 SERPL-SCNC: 27 MMOL/L (ref 23–31)
CREAT SERPL-MCNC: 1 MG/DL (ref 0.55–1.02)
CREAT/UREA NIT SERPL: 17
EOSINOPHIL # BLD AUTO: 0.32 X10(3)/MCL (ref 0–0.9)
EOSINOPHIL NFR BLD AUTO: 4.6 %
ERYTHROCYTE [DISTWIDTH] IN BLOOD BY AUTOMATED COUNT: 12.5 % (ref 11.5–17)
GFR SERPLBLD CREATININE-BSD FMLA CKD-EPI: 55 ML/MIN/1.73/M2
GLOBULIN SER-MCNC: 2.5 GM/DL (ref 2.4–3.5)
GLUCOSE SERPL-MCNC: 102 MG/DL (ref 82–115)
HCT VFR BLD AUTO: 31.3 % (ref 37–47)
HGB BLD-MCNC: 10.2 G/DL (ref 12–16)
IMM GRANULOCYTES # BLD AUTO: 0.03 X10(3)/MCL (ref 0–0.04)
IMM GRANULOCYTES NFR BLD AUTO: 0.4 %
LYMPHOCYTES # BLD AUTO: 1.09 X10(3)/MCL (ref 0.6–4.6)
LYMPHOCYTES NFR BLD AUTO: 15.6 %
MCH RBC QN AUTO: 29.1 PG (ref 27–31)
MCHC RBC AUTO-ENTMCNC: 32.6 G/DL (ref 33–36)
MCV RBC AUTO: 89.4 FL (ref 80–94)
MONOCYTES # BLD AUTO: 0.69 X10(3)/MCL (ref 0.1–1.3)
MONOCYTES NFR BLD AUTO: 9.9 %
NEUTROPHILS # BLD AUTO: 4.82 X10(3)/MCL (ref 2.1–9.2)
NEUTROPHILS NFR BLD AUTO: 69.1 %
NRBC BLD AUTO-RTO: 0 %
PLATELET # BLD AUTO: 180 X10(3)/MCL (ref 130–400)
PMV BLD AUTO: 9.8 FL (ref 7.4–10.4)
POTASSIUM SERPL-SCNC: 4.1 MMOL/L (ref 3.5–5.1)
PROT SERPL-MCNC: 5.7 GM/DL (ref 5.8–7.6)
RBC # BLD AUTO: 3.5 X10(6)/MCL (ref 4.2–5.4)
SODIUM SERPL-SCNC: 138 MMOL/L (ref 136–145)
WBC # BLD AUTO: 6.98 X10(3)/MCL (ref 4.5–11.5)

## 2024-12-28 PROCEDURE — 85025 COMPLETE CBC W/AUTO DIFF WBC: CPT | Performed by: PHYSICIAN ASSISTANT

## 2024-12-28 PROCEDURE — 36415 COLL VENOUS BLD VENIPUNCTURE: CPT | Performed by: PHYSICIAN ASSISTANT

## 2024-12-28 PROCEDURE — 25000003 PHARM REV CODE 250: Performed by: NURSE PRACTITIONER

## 2024-12-28 PROCEDURE — 11000001 HC ACUTE MED/SURG PRIVATE ROOM

## 2024-12-28 PROCEDURE — 63600175 PHARM REV CODE 636 W HCPCS: Performed by: INTERNAL MEDICINE

## 2024-12-28 PROCEDURE — 63600175 PHARM REV CODE 636 W HCPCS: Performed by: PHYSICIAN ASSISTANT

## 2024-12-28 PROCEDURE — 25000003 PHARM REV CODE 250: Performed by: PHYSICIAN ASSISTANT

## 2024-12-28 PROCEDURE — 80053 COMPREHEN METABOLIC PANEL: CPT | Performed by: PHYSICIAN ASSISTANT

## 2024-12-28 PROCEDURE — 99900035 HC TECH TIME PER 15 MIN (STAT)

## 2024-12-28 PROCEDURE — 25000003 PHARM REV CODE 250: Performed by: STUDENT IN AN ORGANIZED HEALTH CARE EDUCATION/TRAINING PROGRAM

## 2024-12-28 RX ORDER — OXYCODONE HYDROCHLORIDE 5 MG/1
5 TABLET ORAL EVERY 8 HOURS PRN
Qty: 12 TABLET | Refills: 0 | Status: CANCELLED | OUTPATIENT
Start: 2024-12-28 | End: 2025-01-01

## 2024-12-28 RX ADMIN — METHOCARBAMOL 500 MG: 500 TABLET ORAL at 03:12

## 2024-12-28 RX ADMIN — SODIUM CHLORIDE, SODIUM LACTATE, POTASSIUM CHLORIDE, CALCIUM CHLORIDE: 20; 30; 600; 310 INJECTION, SOLUTION INTRAVENOUS at 05:12

## 2024-12-28 RX ADMIN — METHOCARBAMOL 500 MG: 500 TABLET ORAL at 09:12

## 2024-12-28 RX ADMIN — ENOXAPARIN SODIUM 30 MG: 30 INJECTION SUBCUTANEOUS at 06:12

## 2024-12-28 RX ADMIN — OXYCODONE HYDROCHLORIDE 10 MG: 10 TABLET ORAL at 01:12

## 2024-12-28 RX ADMIN — ESCITALOPRAM OXALATE 10 MG: 10 TABLET ORAL at 09:12

## 2024-12-28 RX ADMIN — PRAVASTATIN SODIUM 20 MG: 10 TABLET ORAL at 09:12

## 2024-12-28 RX ADMIN — METHOCARBAMOL 500 MG: 500 TABLET ORAL at 08:12

## 2024-12-28 RX ADMIN — LEVOTHYROXINE SODIUM 50 MCG: 0.05 TABLET ORAL at 05:12

## 2024-12-28 RX ADMIN — Medication 6 MG: at 08:12

## 2024-12-28 RX ADMIN — PANTOPRAZOLE SODIUM 40 MG: 40 TABLET, DELAYED RELEASE ORAL at 09:12

## 2024-12-28 RX ADMIN — POLYETHYLENE GLYCOL 3350 17 G: 17 POWDER, FOR SOLUTION ORAL at 09:12

## 2024-12-28 NOTE — PLAN OF CARE
Placed order for walker and sent to [facility] in [facility]. Called referral in and notified patient will be discharged today

## 2024-12-28 NOTE — PROGRESS NOTES
[facility] Medicine Progress Note        Chief Complaint: Inpatient Follow-up for     HPI: 84 yr old female whose history includes CKD 3A and hypothyroidism. At the time of my exam patient is AAO x 3. Family at bedside. Presented to ED following a fall at home. Reports she was wearing slippery shoes and when she went to turn around she fell on right side. Following fall she was able to stand with assistance but unable to bear weight on RLE secondary to pain. Denies syncope. At baseline she lives at home alone and is independent of ADLs. Comfortable at time of my exam and pain well controlled.      VS on arrival: T 98.5, P 75, R 20, B/P 113/64, Sats 95% on room air. Initial labs include BUN 23, creatinine 1.55 and otherwise unremarkable. CT RLE shows subcapital right hip fracture with mild impaction. CT head and C-spine without acute findings. EKG sinus rhythm with no ischemic changes.    patient is status post closed reduction and percutaneous pinning     Interval Hx:   Patient seen and examined this morning denied any pain   Objective/physical exam:  General: In no acute distress, afebrile  Chest: Clear to auscultation bilaterally  Heart: RRR, +S1, S2, no appreciable murmur  Abdomen: Soft, nontender, BS +  MSK: Warm, no lower extremity edema, no clubbing or cyanosis  Neurologic:  Awake and alert  VITAL SIGNS: 24 HRS MIN & MAX LAST   Temp  Min: 97.2 °F (36.2 °C)  Max: 98.5 °F (36.9 °C) 97.2 °F (36.2 °C)   BP  Min: 129/74  Max: 151/79 (!) 151/79   Pulse  Min: 72  Max: 85  76   Resp  Min: 18  Max: 19 18   SpO2  Min: 90 %  Max: 92 % (!) 92 %     I have reviewed the following labs:  Recent Labs   Lab 12/26/24  0501 12/27/24  0400 12/28/24  0610   WBC 6.68 8.23 6.98   RBC 3.70* 3.36* 3.50*   HGB 10.8* 9.8* 10.2*   HCT 32.5* 29.9* 31.3*   MCV 87.8 89.0 89.4   MCH 29.2 29.2 29.1   MCHC 33.2 32.8* 32.6*   RDW 12.1 12.2 12.5    183 180   MPV 9.4 9.9 9.8     Recent Labs   Lab  12/25/24  1642 12/26/24  0501 12/27/24  0400 12/28/24  0610    140 138 138   K 4.4 4.0 4.1 4.1    105 107 104   CO2 27 27 27 27   BUN 23.2* 23.8* 16.1 17.0   CREATININE 1.55* 1.17* 1.16* 1.00   CALCIUM 8.8 8.7 8.3* 8.3*   MG  --  2.10  --   --    ALBUMIN 3.7  --  3.1* 3.2*   ALKPHOS 56  --  49 52   ALT 12  --  9 <5   AST 26  --  18 20   BILITOT 0.5  --  0.4 0.5     Microbiology Results (last 7 days)       ** No results found for the last 168 hours. **             See below for Radiology    Assessment/Plan:  Right femoral neck fracture status post closed reduction and percutaneous pinning of the right femoral neck fracture  Acute kidney injury on CKD 3A   Hypothyroidism   Depression  Anxiety      Patient worked with PT and OT   Family is requesting placement so we will consult case management initially they were okay taking her home but now they are requesting placement   Continue with pain control, orthopedics following   Had ordered chest x-ray this morning that was negative       VTE prophylaxis:  Lovenox    Patient condition:  Stable/Fair/Guarded/ Serious/ Critical    Anticipated discharge and Disposition:         All diagnosis and differential diagnosis have been reviewed; assessment and plan has been documented; I have personally reviewed the labs and test results that are presently available; I have reviewed the patients medication list; I have reviewed the consulting providers response and recommendations. I have reviewed or attempted to review medical records based upon their availability    All of the patient's questions have been  addressed and answered. Patient's is agreeable to the above stated plan. I will continue to monitor closely and make adjustments to medical management as needed.    Portions of this note dictated using EMR integrated voice recognition software, and may be subject to voice recognition errors not corrected at proofreading. Please contact writer for clarification if  needed.   _____________________________________________________________________    Malnutrition Status:  Nutrition consulted. Most recent weight and BMI monitored-     Measurements:  Wt Readings from Last 1 Encounters:   12/26/24 58.1 kg (128 lb)   Body mass index is 23.41 kg/m².    Patient has been screened and assessed by RD.    Malnutrition Type:  Context:    Level:      Malnutrition Characteristic Summary:       Interventions/Recommendations (treatment strategy):        Scheduled Med:   enoxaparin  30 mg Subcutaneous Daily    EScitalopram oxalate  10 mg Oral Daily    levothyroxine  50 mcg Oral Before breakfast    methocarbamoL  500 mg Oral TID    mupirocin   Nasal BID    pantoprazole  40 mg Oral Daily    polyethylene glycol  17 g Oral Daily    pravastatin  20 mg Oral Daily      Continuous Infusions:       PRN Meds:    Current Facility-Administered Medications:     acetaminophen, 1,000 mg, Oral, Q6H PRN    aluminum-magnesium hydroxide-simethicone, 30 mL, Oral, QID PRN    bisacodyL, 10 mg, Rectal, Daily PRN    dextrose 50%, 12.5 g, Intravenous, PRN    dextrose 50%, 25 g, Intravenous, PRN    glucagon (human recombinant), 1 mg, Intramuscular, PRN    glucose, 16 g, Oral, PRN    glucose, 24 g, Oral, PRN    melatonin, 6 mg, Oral, Nightly PRN    morphine, 4 mg, Intravenous, Once PRN    morphine, 4 mg, Intravenous, Q6H PRN    naloxone, 0.02 mg, Intravenous, PRN    ondansetron, 4 mg, Intravenous, Once PRN    ondansetron, 4 mg, Intravenous, Q4H PRN    oxyCODONE, 5 mg, Oral, Q6H PRN    oxyCODONE, 10 mg, Oral, Q4H PRN    prochlorperazine, 5 mg, Intravenous, Q6H PRN    senna-docusate 8.6-50 mg, 1 tablet, Oral, BID PRN    sodium chloride 0.9%, 10 mL, Intravenous, PRN     Radiology:  I have personally reviewed the following imaging and agree with the radiologist.     X-Ray Chest 1 View  Narrative: EXAMINATION:  XR CHEST 1 VIEW    CLINICAL HISTORY:  hypoxia;    TECHNIQUE:  Single frontal view of the chest was  performed.    COMPARISON:  12/25/2024    FINDINGS:  The lungs are clear.  The heart is normal appearance.  The pulmonary vascularity is unremarkable.  Aorta appears grossly unremarkable.  No pleural effusions are seen.  Bones and joints show no acute abnormality.  Impression: No abnormality seen    Electronically signed by: Jaziel Arango  Date:    12/28/2024  Time:    08:55      Jasmine De La Fuente MD  Department of Hospital Medicine   [facility]   12/28/2024

## 2024-12-28 NOTE — PROGRESS NOTES
[facility] - 8th Floor Med Surg  Orthopedics  Progress Note    Patient Name: Trever Del Toro  MRN: [id number]  Admission Date: 12/25/2024  Hospital Length of Stay: 3 days  Attending Provider: Jasmine De La Fuente MD  Primary Care Provider: Rose Trujillo MD  Follow-up For: Procedure(s) (LRB):  PINNING, HIP, PERCUTANEOUS (Right)    Post-Operative Day: 2 Day Post-Op  Subjective:     Principal Problem:Closed fracture of neck of right femur    Principal Orthopedic Problem: 2 Days Post-Op   R hip FNS    Interval History: Patient resting comfortably in bed. Family at bedside. Denies much pain. Reports she is ready for D/C    Review of patient's allergies indicates:   Allergen Reactions    Cephalexin Other (See Comments)    Amoxicillin-pot clavulanate Nausea And Vomiting    Codeine Nausea And Vomiting       Current Facility-Administered Medications   Medication    acetaminophen tablet 1,000 mg    aluminum-magnesium hydroxide-simethicone 200-200-20 mg/5 mL suspension 30 mL    bisacodyL suppository 10 mg    dextrose 50% injection 12.5 g    dextrose 50% injection 25 g    enoxaparin injection 30 mg    EScitalopram oxalate tablet 10 mg    glucagon (human recombinant) injection 1 mg    glucose chewable tablet 16 g    glucose chewable tablet 24 g    levothyroxine tablet 50 mcg    melatonin tablet 6 mg    methocarbamoL tablet 500 mg    morphine injection 4 mg    morphine injection 4 mg    mupirocin 2 % ointment    naloxone 0.4 mg/mL injection 0.02 mg    ondansetron injection 4 mg    ondansetron injection 4 mg    oxyCODONE immediate release tablet 5 mg    oxyCODONE immediate release tablet Tab 10 mg    pantoprazole EC tablet 40 mg    polyethylene glycol packet 17 g    pravastatin tablet 20 mg    prochlorperazine injection Soln 5 mg    senna-docusate 8.6-50 mg per tablet 1 tablet    sodium chloride 0.9% flush 10 mL     Objective:     Vital Signs (Most Recent):  Temp: 98 °F (36.7 °C) (12/28/24 0721)  Pulse: 85 (12/28/24  "0721)  Resp: 19 (12/27/24 1922)  BP: (!) 149/79 (12/28/24 0721)  SpO2: (!) 91 % (12/28/24 0721) Vital Signs (24h Range):  Temp:  [97.8 °F (36.6 °C)-98.5 °F (36.9 °C)] 98 °F (36.7 °C)  Pulse:  [72-85] 85  Resp:  [18-19] 19  SpO2:  [90 %-93 %] 91 %  BP: (129-161)/(74-87) 149/79     Weight: 58.1 kg (128 lb)  Height: 5' 2" (157.5 cm)  Body mass index is 23.41 kg/m².      Intake/Output Summary (Last 24 hours) at 12/28/2024 0850  Last data filed at 12/28/2024 0430  Gross per 24 hour   Intake 1132.31 ml   Output 1800 ml   Net -667.69 ml       Physical Exam:   General the patient is alert and oriented x3 no acute distress nontoxic-appearing appropriate affect.    Constitutional: Vital signs are examined and stable.  Resp: No signs of labored breathing                        RLE: -Skin: Dressing CDI            -MSK: : Hip and Knee F/E, EHL/FHL, Gastroc/Tib anterior Strength 5/5           -Neuro:  Sensation intact to light touch L3-S1 dermatomes           -Lymphatic: No signs of lymphadenopathy           -CV: Capillary refill is less than 2 seconds. Compartments soft. DP palpable        Diagnostic Findings:   Significant Labs:   Recent Lab Results  (Last 5 results in the past 72 hours)        12/28/24  0610   12/27/24  0400   12/26/24  0501   12/25/24  1846   12/25/24  1841        Albumin/Globulin Ratio 1.3   1.6             ABO and RH       B POS         Albumin 3.2   3.1             ALP 52   49             ALT <5   9             Anion Gap 7.0   4.0   8.0           AST 20   18             Baso # 0.03   0.02   0.03           Basophil % 0.4   0.2   0.4           BILIRUBIN TOTAL 0.5   0.4             BUN 17.0   16.1   23.8           BUN/CREAT RATIO 17   14   20           Calcium 8.3   8.3   8.7           Chloride 104   107   105           CO2 27   27   27           Creatinine 1.00   1.16   1.17           eGFR 55   46   46           Eos # 0.32   0.06   0.23           Eos % 4.6   0.7   3.4           Globulin, Total 2.5   2.0      "        Glucose 102   112   103           Hematocrit 31.3   29.9   32.5           Hemoglobin 10.2   9.8   10.8           Immature Grans (Abs) 0.03   0.03   0.02           Immature Granulocytes 0.4   0.4   0.3           Lymph # 1.09   1.03   1.11           LYMPH % 15.6   12.5   16.6           Magnesium      2.10           MCH 29.1   29.2   29.2           MCHC 32.6   32.8   33.2           MCV 89.4   89.0   87.8           Mono # 0.69   0.53   0.52           Mono % 9.9   6.4   7.8           MPV 9.8   9.9   9.4           Neut # 4.82   6.56   4.77           Neut % 69.1   79.8   71.5           nRBC 0.0   0.0   0.0           QRS Duration         70       [facility] QTC Calculation         421       Platelet Count 180   183   195           Potassium 4.1   4.1   4.0           PROTEIN TOTAL 5.7   5.1             RBC 3.50   3.36   3.70           RDW 12.5   12.2   12.1           Sodium 138   138   140           WBC 6.98   8.23   6.68                                   Significant Imaging: I have reviewed and interpreted all pertinent imaging results/findings.     Assessment/Plan:     Active Diagnoses:    Diagnosis Date Noted POA    PRINCIPAL PROBLEM:  Closed fracture of neck of right femur [S72.001A] 12/26/2024 Yes      Problems Resolved During this Admission:   POD 1 R hip FNS.   WBAT and ROMAT with PT.    Begin daily dry dressing changes today. May cover with soft dressing or large band aid. Keep clean and dry. No showers to POD 7. Do not submerge. Do not apply ointments or creams.  Continue multimodal pain control. Lovenox for DVT ppx during stay and aspirin 81 mgBID at discharge.   Will continue to follow through her stay.   Follow PT recs home with   vs rehab.   Follow up with Magnus Santiago PA-C/ Dr. Art Agudelo in clinic in 3 weeks for wound check and repeat evaluation.         The above findings, diagnostics, and treatment plan were discussed with Dr. Agudelo who is in agreement with the plan of care except as stated in additional  documentation.      Viky Bustos, NORRISP  Orthopedic Trauma Surgery  [facility]

## 2024-12-28 NOTE — NURSING
"At shift report with Laurita LÓPEZ, patient requested no vital signs for the night stating "I want to rest"  "

## 2024-12-29 LAB
ALBUMIN SERPL-MCNC: 3.4 G/DL (ref 3.4–4.8)
ALBUMIN/GLOB SERPL: 1.4 RATIO (ref 1.1–2)
ALP SERPL-CCNC: 68 UNIT/L (ref 40–150)
ALT SERPL-CCNC: 12 UNIT/L (ref 0–55)
ANION GAP SERPL CALC-SCNC: 8 MEQ/L
AST SERPL-CCNC: 41 UNIT/L (ref 5–34)
BASOPHILS # BLD AUTO: 0.03 X10(3)/MCL
BASOPHILS NFR BLD AUTO: 0.5 %
BILIRUB SERPL-MCNC: 0.6 MG/DL
BUN SERPL-MCNC: 16.2 MG/DL (ref 9.8–20.1)
CALCIUM SERPL-MCNC: 8.7 MG/DL (ref 8.4–10.2)
CHLORIDE SERPL-SCNC: 101 MMOL/L (ref 98–107)
CO2 SERPL-SCNC: 31 MMOL/L (ref 23–31)
CREAT SERPL-MCNC: 1.27 MG/DL (ref 0.55–1.02)
CREAT/UREA NIT SERPL: 13
EOSINOPHIL # BLD AUTO: 0.25 X10(3)/MCL (ref 0–0.9)
EOSINOPHIL NFR BLD AUTO: 4.3 %
ERYTHROCYTE [DISTWIDTH] IN BLOOD BY AUTOMATED COUNT: 12.7 % (ref 11.5–17)
GFR SERPLBLD CREATININE-BSD FMLA CKD-EPI: 42 ML/MIN/1.73/M2
GLOBULIN SER-MCNC: 2.5 GM/DL (ref 2.4–3.5)
GLUCOSE SERPL-MCNC: 124 MG/DL (ref 82–115)
HCT VFR BLD AUTO: 32.8 % (ref 37–47)
HGB BLD-MCNC: 10.7 G/DL (ref 12–16)
IMM GRANULOCYTES # BLD AUTO: 0.05 X10(3)/MCL (ref 0–0.04)
IMM GRANULOCYTES NFR BLD AUTO: 0.9 %
LYMPHOCYTES # BLD AUTO: 0.91 X10(3)/MCL (ref 0.6–4.6)
LYMPHOCYTES NFR BLD AUTO: 15.7 %
MCH RBC QN AUTO: 29.8 PG (ref 27–31)
MCHC RBC AUTO-ENTMCNC: 32.6 G/DL (ref 33–36)
MCV RBC AUTO: 91.4 FL (ref 80–94)
MONOCYTES # BLD AUTO: 0.46 X10(3)/MCL (ref 0.1–1.3)
MONOCYTES NFR BLD AUTO: 7.9 %
NEUTROPHILS # BLD AUTO: 4.09 X10(3)/MCL (ref 2.1–9.2)
NEUTROPHILS NFR BLD AUTO: 70.7 %
NRBC BLD AUTO-RTO: 0 %
PLATELET # BLD AUTO: 217 X10(3)/MCL (ref 130–400)
PMV BLD AUTO: 10.1 FL (ref 7.4–10.4)
POTASSIUM SERPL-SCNC: 4.4 MMOL/L (ref 3.5–5.1)
PROT SERPL-MCNC: 5.9 GM/DL (ref 5.8–7.6)
RBC # BLD AUTO: 3.59 X10(6)/MCL (ref 4.2–5.4)
SODIUM SERPL-SCNC: 140 MMOL/L (ref 136–145)
WBC # BLD AUTO: 5.79 X10(3)/MCL (ref 4.5–11.5)

## 2024-12-29 PROCEDURE — 94799 UNLISTED PULMONARY SVC/PX: CPT

## 2024-12-29 PROCEDURE — 25000003 PHARM REV CODE 250: Performed by: PHYSICIAN ASSISTANT

## 2024-12-29 PROCEDURE — 11000001 HC ACUTE MED/SURG PRIVATE ROOM

## 2024-12-29 PROCEDURE — 85025 COMPLETE CBC W/AUTO DIFF WBC: CPT | Performed by: PHYSICIAN ASSISTANT

## 2024-12-29 PROCEDURE — 99900031 HC PATIENT EDUCATION (STAT)

## 2024-12-29 PROCEDURE — 63600175 PHARM REV CODE 636 W HCPCS: Performed by: PHYSICIAN ASSISTANT

## 2024-12-29 PROCEDURE — 36415 COLL VENOUS BLD VENIPUNCTURE: CPT | Performed by: PHYSICIAN ASSISTANT

## 2024-12-29 PROCEDURE — 25000003 PHARM REV CODE 250: Performed by: NURSE PRACTITIONER

## 2024-12-29 PROCEDURE — 80053 COMPREHEN METABOLIC PANEL: CPT | Performed by: PHYSICIAN ASSISTANT

## 2024-12-29 PROCEDURE — 97535 SELF CARE MNGMENT TRAINING: CPT | Mod: CO

## 2024-12-29 PROCEDURE — 25000003 PHARM REV CODE 250: Performed by: STUDENT IN AN ORGANIZED HEALTH CARE EDUCATION/TRAINING PROGRAM

## 2024-12-29 RX ADMIN — LEVOTHYROXINE SODIUM 50 MCG: 0.05 TABLET ORAL at 05:12

## 2024-12-29 RX ADMIN — PANTOPRAZOLE SODIUM 40 MG: 40 TABLET, DELAYED RELEASE ORAL at 09:12

## 2024-12-29 RX ADMIN — SENNOSIDES AND DOCUSATE SODIUM 1 TABLET: 50; 8.6 TABLET ORAL at 11:12

## 2024-12-29 RX ADMIN — METHOCARBAMOL 500 MG: 500 TABLET ORAL at 08:12

## 2024-12-29 RX ADMIN — POLYETHYLENE GLYCOL 3350 17 G: 17 POWDER, FOR SOLUTION ORAL at 09:12

## 2024-12-29 RX ADMIN — METHOCARBAMOL 500 MG: 500 TABLET ORAL at 03:12

## 2024-12-29 RX ADMIN — OXYCODONE HYDROCHLORIDE 10 MG: 10 TABLET ORAL at 11:12

## 2024-12-29 RX ADMIN — METHOCARBAMOL 500 MG: 500 TABLET ORAL at 09:12

## 2024-12-29 RX ADMIN — ESCITALOPRAM OXALATE 10 MG: 10 TABLET ORAL at 09:12

## 2024-12-29 RX ADMIN — PRAVASTATIN SODIUM 20 MG: 10 TABLET ORAL at 09:12

## 2024-12-29 RX ADMIN — ENOXAPARIN SODIUM 30 MG: 30 INJECTION SUBCUTANEOUS at 04:12

## 2024-12-29 NOTE — CONSULTS
[facility] - 8th Floor Med Surg  Wound Care    Patient Name:  Trever Del Toro   MRN:  [id number]  Date: 12/29/2024  Diagnosis: Closed fracture of neck of right femur    History:     Past Medical History:   Diagnosis Date    Anticoagulant long-term use     asa 81mg    High cholesterol     Stroke     TIA many years ago // no deficeit    Thyroid disease        Social History     Socioeconomic History    Marital status:    Tobacco Use    Smoking status: Never    Smokeless tobacco: Never   Substance and Sexual Activity    Alcohol use: Not Currently    Drug use: Never    Sexual activity: Not Currently     Social Drivers of Health     Financial Resource Strain: Low Risk  (12/26/2024)    Overall Financial Resource Strain (CARDIA)     Difficulty of Paying Living Expenses: Not hard at all   Food Insecurity: No Food Insecurity (12/26/2024)    Hunger Vital Sign     Worried About Running Out of Food in the Last Year: Never true     Ran Out of Food in the Last Year: Never true   Transportation Needs: No Transportation Needs (12/26/2024)    TRANSPORTATION NEEDS     Transportation : No   Stress: No Stress Concern Present (12/26/2024)    [employer] [place] [employer] - Occupational Stress Questionnaire     Feeling of Stress : Not at all   Housing Stability: Low Risk  (12/26/2024)    Housing Stability Vital Sign     Unable to Pay for Housing in the Last Year: No     Homeless in the Last Year: No       Precautions:     Allergies as of 12/25/2024 - Reviewed 12/25/2024   Allergen Reaction Noted    Cephalexin Other (See Comments) 03/04/2021    Amoxicillin-pot clavulanate Nausea And Vomiting 03/04/2021    Codeine Nausea And Vomiting 03/04/2021       WO Assessment Details/Treatment      12/29/24 0947   WOCN Assessment   Visit Date 12/29/24   Visit Time 0947   Consult Type New   WO Speciality Wound   Intervention chart review;assessed;applied;orders   Teaching on-going        Wound 12/27/24 1556 Traumatic  Left medial Elbow   Date First Assessed/Time First Assessed: 12/27/24 9236   Present on Original Admission: Yes  Primary Wound Type: Traumatic  Side: Left  Orientation: medial  Location: Elbow  Is this injury device related?: No   Wound Image    Dressing Appearance Dried drainage   Drainage Amount Small   Drainage Characteristics/Odor Serosanguineous   Appearance Pink;Red;Yellow;Moist   Tissue loss description Partial thickness   Black (%), Wound Tissue Color 0 %   Red (%), Wound Tissue Color 50 %   Yellow (%), Wound Tissue Color 50 %   Periwound Area Intact;Dry;Pink   Wound Edges Irregular   Wound Length (cm) 0.5 cm   Wound Width (cm) 0.4 cm   Wound Depth (cm) 0.1 cm   Wound Volume (cm^3) 0.02 cm^3   Wound Surface Area (cm^2) 0.2 cm^2   Care Cleansed with:;Antimicrobial agent   Dressing Applied  (small foam)   WOCN consult for right elbow. Pt ambulating in [place] with walker and son at side. After patient returned to room ; assisted to bed with assistance of myself and Man nurse tech. Explained reason for visit. Pt consented and stated she hit her elbow with fall. Removed dressing in place. Photos obtained and measurements taken. Treatment recommendations : Cleanse with Vashe. Dry well. Apply small foam dressing to be changed q 2 days and prn. Pt tolerated well. Nursing to continue with treatment recommendations. Orders placed. Will follow up.  12/29/2024

## 2024-12-29 NOTE — PROGRESS NOTES
[facility] Medicine Progress Note        Chief Complaint: Inpatient Follow-up for     HPI: 84 yr old female whose history includes CKD 3A and hypothyroidism. At the time of my exam patient is AAO x 3. Family at bedside. Presented to ED following a fall at home. Reports she was wearing slippery shoes and when she went to turn around she fell on right side. Following fall she was able to stand with assistance but unable to bear weight on RLE secondary to pain. Denies syncope. At baseline she lives at home alone and is independent of ADLs. Comfortable at time of my exam and pain well controlled.      VS on arrival: T 98.5, P 75, R 20, B/P 113/64, Sats 95% on room air. Initial labs include BUN 23, creatinine 1.55 and otherwise unremarkable. CT RLE shows subcapital right hip fracture with mild impaction. CT head and C-spine without acute findings. EKG sinus rhythm with no ischemic changes.    patient is status post closed reduction and percutaneous pinning     Interval Hx:   Patient seen and examined this morning denied any pain patient's friend bedside no other issues reported overnight  Objective/physical exam:  General: In no acute distress, afebrile  Chest: Clear to auscultation bilaterally  Heart: RRR, +S1, S2, no appreciable murmur  Abdomen: Soft, nontender, BS +  MSK: Warm, no lower extremity edema, no clubbing or cyanosis  Neurologic:  Awake and alert  VITAL SIGNS: 24 HRS MIN & MAX LAST   Temp  Min: 98 °F (36.7 °C)  Max: 98.6 °F (37 °C) 98.6 °F (37 °C)   BP  Min: 110/69  Max: 155/77 110/69   Pulse  Min: 70  Max: 84  84   Resp  Min: 18  Max: 20 18   SpO2  Min: 87 %  Max: 97 % (!) 87 %     I have reviewed the following labs:  Recent Labs   Lab 12/27/24  0400 12/28/24  0610 12/29/24  0752   WBC 8.23 6.98 5.79   RBC 3.36* 3.50* 3.59*   HGB 9.8* 10.2* 10.7*   HCT 29.9* 31.3* 32.8*   MCV 89.0 89.4 91.4   MCH 29.2 29.1 29.8   MCHC 32.8* 32.6* 32.6*   RDW 12.2 12.5 12.7     180 217   MPV 9.9 9.8 10.1     Recent Labs   Lab 12/26/24  0501 12/27/24  0400 12/28/24  0610 12/29/24  0752    138 138 140   K 4.0 4.1 4.1 4.4    107 104 101   CO2 27 27 27 31   BUN 23.8* 16.1 17.0 16.2   CREATININE 1.17* 1.16* 1.00 1.27*   CALCIUM 8.7 8.3* 8.3* 8.7   MG 2.10  --   --   --    ALBUMIN  --  3.1* 3.2* 3.4   ALKPHOS  --  49 52 68   ALT  --  9 <5 12   AST  --  18 20 41*   BILITOT  --  0.4 0.5 0.6     Microbiology Results (last 7 days)       ** No results found for the last 168 hours. **             See below for Radiology    Assessment/Plan:  Right femoral neck fracture status post closed reduction and percutaneous pinning of the right femoral neck fracture  Acute kidney injury on CKD 3A   Hypothyroidism   Depression  Anxiety    Work from this morning creatinine of 1.2  Patient worked with PT and OT   Family is requesting placement so we will consult case management initially they were okay taking her home but now they are requesting placement   Continue with pain control, orthopedics following   Had ordered chest x-ray that was negative     Will consult case management tomorrow for placement      VTE prophylaxis:  Lovenox    Patient condition:  Stable/Fair/Guarded/ Serious/ Critical    Anticipated discharge and Disposition:         All diagnosis and differential diagnosis have been reviewed; assessment and plan has been documented; I have personally reviewed the labs and test results that are presently available; I have reviewed the patients medication list; I have reviewed the consulting providers response and recommendations. I have reviewed or attempted to review medical records based upon their availability    All of the patient's questions have been  addressed and answered. Patient's is agreeable to the above stated plan. I will continue to monitor closely and make adjustments to medical management as needed.    Portions of this note dictated using EMR integrated voice recognition  software, and may be subject to voice recognition errors not corrected at proofreading. Please contact writer for clarification if needed.   _____________________________________________________________________    Malnutrition Status:  Nutrition consulted. Most recent weight and BMI monitored-     Measurements:  Wt Readings from Last 1 Encounters:   12/26/24 58.1 kg (128 lb)   Body mass index is 23.41 kg/m².    Patient has been screened and assessed by RD.    Malnutrition Type:  Context:    Level:      Malnutrition Characteristic Summary:       Interventions/Recommendations (treatment strategy):        Scheduled Med:   enoxaparin  30 mg Subcutaneous Daily    EScitalopram oxalate  10 mg Oral Daily    levothyroxine  50 mcg Oral Before breakfast    methocarbamoL  500 mg Oral TID    mupirocin   Nasal BID    pantoprazole  40 mg Oral Daily    polyethylene glycol  17 g Oral Daily    pravastatin  20 mg Oral Daily      Continuous Infusions:       PRN Meds:    Current Facility-Administered Medications:     acetaminophen, 1,000 mg, Oral, Q6H PRN    aluminum-magnesium hydroxide-simethicone, 30 mL, Oral, QID PRN    bisacodyL, 10 mg, Rectal, Daily PRN    dextrose 50%, 12.5 g, Intravenous, PRN    dextrose 50%, 25 g, Intravenous, PRN    glucagon (human recombinant), 1 mg, Intramuscular, PRN    glucose, 16 g, Oral, PRN    glucose, 24 g, Oral, PRN    melatonin, 6 mg, Oral, Nightly PRN    morphine, 4 mg, Intravenous, Once PRN    morphine, 4 mg, Intravenous, Q6H PRN    naloxone, 0.02 mg, Intravenous, PRN    ondansetron, 4 mg, Intravenous, Once PRN    ondansetron, 4 mg, Intravenous, Q4H PRN    oxyCODONE, 5 mg, Oral, Q6H PRN    oxyCODONE, 10 mg, Oral, Q4H PRN    prochlorperazine, 5 mg, Intravenous, Q6H PRN    senna-docusate 8.6-50 mg, 1 tablet, Oral, BID PRN    sodium chloride 0.9%, 10 mL, Intravenous, PRN     Radiology:  I have personally reviewed the following imaging and agree with the radiologist.     X-Ray Chest 1 View  Narrative:  EXAMINATION:  XR CHEST 1 VIEW    CLINICAL HISTORY:  hypoxia;    TECHNIQUE:  Single frontal view of the chest was performed.    COMPARISON:  12/25/2024    FINDINGS:  The lungs are clear.  The heart is normal appearance.  The pulmonary vascularity is unremarkable.  Aorta appears grossly unremarkable.  No pleural effusions are seen.  Bones and joints show no acute abnormality.  Impression: No abnormality seen    Electronically signed by: Jaziel Arango  Date:    12/28/2024  Time:    08:55      Jasmine De La Fuente MD  Department of Hospital Medicine   [facility]   12/29/2024

## 2024-12-29 NOTE — PT/OT/SLP PROGRESS
Occupational Therapy   Treatment    Name: Trever Del Toro  MRN: [id number]  Admitting Diagnosis:  Closed fracture of neck of right femur  3 Days Post-Op    Recommendations:     Recommended therapy intensity at discharge: High Intensity Therapy   Discharge Equipment Recommendations:  walker, rolling  Barriers to discharge:       Assessment:     Trever Del Toro is a 84 y.o. female with a medical diagnosis of Closed fracture of neck of right femur.  Performance deficits affecting function are weakness, impaired endurance, orthopedic precautions, impaired self care skills, impaired functional mobility, impaired balance, pain. Patient family not able to provide long term assistance for patient 2/2 all family [place] out of [place]. Recommending High intensity at this time for patient to go home independently.     Rehab Prognosis:  Good; patient would benefit from acute skilled OT services to address these deficits and reach maximum level of function.       Plan:     Patient to be seen 6 x/week to address the above listed problems via self-care/home management, therapeutic activities, therapeutic exercises  Plan of Care Expires: 01/24/25  Plan of Care Reviewed with: patient, family    Subjective     Pain/Comfort:       Objective:     Communicated with: RN prior to session.  Patient found HOB elevated with   upon OT entry to room.    General Precautions: Standard, fall    Orthopedic Precautions:RLE weight bearing as tolerated (ROMAT)  Braces: N/A     Occupational Performance:     Bed Mobility:    Patient completed Supine to Sit with stand by assistance  Patient completed Sit to Supine with stand by assistance     Functional Mobility/Transfers:  Patient completed Sit <> Stand Transfer with minimum assistance  with  rolling walker   Patient completed Toilet Transfer Step Transfer technique with contact guard assistance with  rolling walker  Functional Mobility: no LOB    Activities of Daily Living:  Lower Body Dressing: stand by  assistance patient educated on and performed LB dressing with AE sitting EOB  Toileting: independence pericare following +void    Patient Education:  Patient provided with verbal education education regarding OT role/goals/POC.  Understanding was verbalized.      Patient left HOB elevated with all lines intact and call button in reach.    GOALS:   Multidisciplinary Problems       Occupational Therapy Goals          Problem: Occupational Therapy    Goal Priority Disciplines Outcome Interventions   Occupational Therapy Goal     OT, PT/OT Progressing    Description: LTG: Pt will perform basic ADLs and ADL transfers with Modified independence using LRAD by discharge.    STG: to be met by 1/24/25:    Pt will complete grooming standing at sink with LRAD with SBA.  Pt will complete UB dressing with SBA.  Pt will complete LB dressing with SBA using LRAD.  Pt will complete toileting with SBA using LRAD.  Pt will complete functional mobility to/from toilet and toilet transfer with SBA using LRAD.                        Time Tracking:     OT Date of Treatment: 12/29/24  OT Start Time: 1256  OT Stop Time: 1319  OT Total Time (min): 23 min    Billable Minutes:Self Care/Home Management 2    OT/ISRRAEL: ISRRAEL     Number of ISRRAEL visits since last OT visit: 1    12/29/2024

## 2024-12-30 VITALS
HEART RATE: 97 BPM | OXYGEN SATURATION: 93 % | SYSTOLIC BLOOD PRESSURE: 134 MMHG | WEIGHT: 128 LBS | BODY MASS INDEX: 23.55 KG/M2 | RESPIRATION RATE: 20 BRPM | DIASTOLIC BLOOD PRESSURE: 54 MMHG | HEIGHT: 62 IN | TEMPERATURE: 98 F

## 2024-12-30 LAB
ANION GAP SERPL CALC-SCNC: 8 MEQ/L
BASOPHILS # BLD AUTO: 0.04 X10(3)/MCL
BASOPHILS NFR BLD AUTO: 0.6 %
BUN SERPL-MCNC: 19.8 MG/DL (ref 9.8–20.1)
CALCIUM SERPL-MCNC: 9.3 MG/DL (ref 8.4–10.2)
CHLORIDE SERPL-SCNC: 103 MMOL/L (ref 98–107)
CO2 SERPL-SCNC: 29 MMOL/L (ref 23–31)
CREAT SERPL-MCNC: 1.13 MG/DL (ref 0.55–1.02)
CREAT/UREA NIT SERPL: 18
D DIMER PPP IA.FEU-MCNC: 3.99 UG/ML FEU (ref 0–0.5)
EOSINOPHIL # BLD AUTO: 0.26 X10(3)/MCL (ref 0–0.9)
EOSINOPHIL NFR BLD AUTO: 4.1 %
ERYTHROCYTE [DISTWIDTH] IN BLOOD BY AUTOMATED COUNT: 12.5 % (ref 11.5–17)
GFR SERPLBLD CREATININE-BSD FMLA CKD-EPI: 48 ML/MIN/1.73/M2
GLUCOSE SERPL-MCNC: 92 MG/DL (ref 82–115)
HCT VFR BLD AUTO: 34.1 % (ref 37–47)
HGB BLD-MCNC: 11 G/DL (ref 12–16)
IMM GRANULOCYTES # BLD AUTO: 0.07 X10(3)/MCL (ref 0–0.04)
IMM GRANULOCYTES NFR BLD AUTO: 1.1 %
LYMPHOCYTES # BLD AUTO: 1.14 X10(3)/MCL (ref 0.6–4.6)
LYMPHOCYTES NFR BLD AUTO: 17.8 %
MCH RBC QN AUTO: 29.3 PG (ref 27–31)
MCHC RBC AUTO-ENTMCNC: 32.3 G/DL (ref 33–36)
MCV RBC AUTO: 90.7 FL (ref 80–94)
MONOCYTES # BLD AUTO: 0.69 X10(3)/MCL (ref 0.1–1.3)
MONOCYTES NFR BLD AUTO: 10.8 %
NEUTROPHILS # BLD AUTO: 4.2 X10(3)/MCL (ref 2.1–9.2)
NEUTROPHILS NFR BLD AUTO: 65.6 %
NRBC BLD AUTO-RTO: 0 %
PLATELET # BLD AUTO: 234 X10(3)/MCL (ref 130–400)
PMV BLD AUTO: 9.4 FL (ref 7.4–10.4)
POTASSIUM SERPL-SCNC: 4.3 MMOL/L (ref 3.5–5.1)
RBC # BLD AUTO: 3.76 X10(6)/MCL (ref 4.2–5.4)
SODIUM SERPL-SCNC: 140 MMOL/L (ref 136–145)
WBC # BLD AUTO: 6.4 X10(3)/MCL (ref 4.5–11.5)

## 2024-12-30 PROCEDURE — 25500020 PHARM REV CODE 255: Performed by: INTERNAL MEDICINE

## 2024-12-30 PROCEDURE — 97535 SELF CARE MNGMENT TRAINING: CPT

## 2024-12-30 PROCEDURE — 85025 COMPLETE CBC W/AUTO DIFF WBC: CPT | Performed by: INTERNAL MEDICINE

## 2024-12-30 PROCEDURE — 80048 BASIC METABOLIC PNL TOTAL CA: CPT | Performed by: INTERNAL MEDICINE

## 2024-12-30 PROCEDURE — 99900031 HC PATIENT EDUCATION (STAT)

## 2024-12-30 PROCEDURE — 97116 GAIT TRAINING THERAPY: CPT | Mod: CQ

## 2024-12-30 PROCEDURE — 25000003 PHARM REV CODE 250: Performed by: STUDENT IN AN ORGANIZED HEALTH CARE EDUCATION/TRAINING PROGRAM

## 2024-12-30 PROCEDURE — 36415 COLL VENOUS BLD VENIPUNCTURE: CPT | Performed by: INTERNAL MEDICINE

## 2024-12-30 PROCEDURE — 94799 UNLISTED PULMONARY SVC/PX: CPT

## 2024-12-30 PROCEDURE — 25000003 PHARM REV CODE 250: Performed by: NURSE PRACTITIONER

## 2024-12-30 PROCEDURE — 25000003 PHARM REV CODE 250: Performed by: PHYSICIAN ASSISTANT

## 2024-12-30 PROCEDURE — 85379 FIBRIN DEGRADATION QUANT: CPT | Performed by: INTERNAL MEDICINE

## 2024-12-30 RX ORDER — ASPIRIN 81 MG/1
81 TABLET ORAL 2 TIMES DAILY
Start: 2024-12-30 | End: 2025-01-29

## 2024-12-30 RX ADMIN — METHOCARBAMOL 500 MG: 500 TABLET ORAL at 03:12

## 2024-12-30 RX ADMIN — MUPIROCIN: 20 OINTMENT TOPICAL at 09:12

## 2024-12-30 RX ADMIN — ESCITALOPRAM OXALATE 10 MG: 10 TABLET ORAL at 09:12

## 2024-12-30 RX ADMIN — METHOCARBAMOL 500 MG: 500 TABLET ORAL at 09:12

## 2024-12-30 RX ADMIN — LEVOTHYROXINE SODIUM 50 MCG: 0.05 TABLET ORAL at 06:12

## 2024-12-30 RX ADMIN — POLYETHYLENE GLYCOL 3350 17 G: 17 POWDER, FOR SOLUTION ORAL at 09:12

## 2024-12-30 RX ADMIN — PANTOPRAZOLE SODIUM 40 MG: 40 TABLET, DELAYED RELEASE ORAL at 09:12

## 2024-12-30 RX ADMIN — IOHEXOL 100 ML: 350 INJECTION, SOLUTION INTRAVENOUS at 12:12

## 2024-12-30 NOTE — PT/OT/SLP PROGRESS
Occupational Therapy   Treatment    Name: Trever Del Toro  MRN: [id number]  Admitting Diagnosis: Fall: R femoral neck fx s/p CRPP.     Recommendations:     Recommended therapy intensity at discharge: High Intensity Therapy   Discharge Equipment Recommendations:  walker, rolling  Barriers to discharge:  None    Assessment:     Trever Del Toro is a 84 y.o. female with a medical diagnosis of Fall: R femoral neck fx s/p CRPP. Performance deficits affecting function are weakness, impaired endurance, orthopedic precautions, impaired self care skills, impaired functional mobility, impaired balance, pain.     Rehab Prognosis:  Good; patient would benefit from acute skilled OT services to address these deficits and reach maximum level of function.       Plan:     Patient to be seen 6 x/week to address the above listed problems via self-care/home management, therapeutic activities, therapeutic exercises  Plan of Care Expires: 01/24/25  Plan of Care Reviewed with: patient, family    Subjective     Pain/Comfort:  Pain Rating 1: 6/10  Location - Side 1: Right  Location 1: leg  Pain Addressed 1: Reposition, Distraction    Objective:     Communicated with: RN prior to session.  Patient found supine with burnette catheter, peripheral IV upon OT entry to room.    General Precautions: Standard, fall    Orthopedic Precautions:RLE weight bearing as tolerated (ROMAT)  Braces: N/A  Respiratory Status: Room air  Occupational Performance:     Bed Mobility:    Patient completed Supine to Sit with stand by assistance     Functional Mobility/Transfers:  Patient completed Sit <> Stand Transfer with stand by assistance  with  rolling walker   Functional Mobility: Pt ambulated community distance using RW c SBA. Pt reported she just ambulated to bathroom prior therapist entry and has been ambulating hallways c son    Activities of Daily Living:  Lower Body Dressing: stand by assistance Doff/don R sock using reacher and sock aide; Able to doff/don L sock  in fig 4       [facility] 6 Click ADL: 23    Patient Education:  Patient and son/s were provided with verbal education education regarding OT role/goals/POC.  Understanding was verbalized.      Patient left up in chair with call button in reach.    GOALS:   Multidisciplinary Problems       Occupational Therapy Goals          Problem: Occupational Therapy    Goal Priority Disciplines Outcome Interventions   Occupational Therapy Goal     OT, PT/OT Progressing    Description: LTG: Pt will perform basic ADLs and ADL transfers with Modified independence using LRAD by discharge.    STG: to be met by 1/24/25:    Pt will complete grooming standing at sink with LRAD with SBA.  Pt will complete UB dressing with SBA.  Pt will complete LB dressing with SBA using LRAD.  Pt will complete toileting with SBA using LRAD.  Pt will complete functional mobility to/from toilet and toilet transfer with SBA using LRAD.                        Time Tracking:     OT Date of Treatment: 12/30/24  OT Start Time: 1126  OT Stop Time: 1140  OT Total Time (min): 14 min    Billable Minutes:Self Care/Home Management 1 unit    OT/ISRRAEL: OT     Number of ISRRAEL visits since last OT visit: 2    12/30/2024

## 2024-12-30 NOTE — PLAN OF CARE
12/30/24 1451   Final Note   Assessment Type Final Discharge Note   Anticipated Discharge Disposition Rehab   Hospital Resources/Appts/Education Provided Appointments scheduled and added to AVS   Post-Acute Status   Post-Acute Authorization Placement   Post-Acute Placement Status Set-up Complete/Auth obtained   Discharge Delays None known at this time     Pt being discharged to [facility] today. Their facility will send transport van.   DC order, clinicals, summary and AVS sent to rehab via [employer]. Pt and family is aware of discharge to rehab today.

## 2024-12-30 NOTE — DISCHARGE SUMMARY
[facility] Hospital Medicine Discharge Summary    Admit Date: 12/25/2024  Discharge Date and Time: 12/30/20242:07 PM  Admitting Physician:  Team  Discharging Physician: Jasmine De La Fuente MD.  Primary Care Physician: Rose Trujillo MD  Consults: {consultation: 14623}    Discharge Diagnoses:  ***    Hospital Course:   ***  Pt was seen and examined on the day of discharge  Vitals:  VITAL SIGNS: 24 HRS MIN & MAX LAST   Temp  Min: 98.8 °F (37.1 °C)  Max: 99 °F (37.2 °C) 98.8 °F (37.1 °C)   BP  Min: 109/66  Max: 125/80 125/80   Pulse  Min: 79  Max: 85  85   Resp  Min: 20  Max: 20 20   SpO2  Min: 91 %  Max: 93 % (!) 93 %       Physical Exam:  ***    Procedures Performed: No admission procedures for hospital encounter.     Significant Diagnostic Studies: See Full reports for all details    Recent Labs   Lab 12/28/24  0610 12/29/24  0752 12/30/24  0919   WBC 6.98 5.79 6.40   RBC 3.50* 3.59* 3.76*   HGB 10.2* 10.7* 11.0*   HCT 31.3* 32.8* 34.1*   MCV 89.4 91.4 90.7   MCH 29.1 29.8 29.3   MCHC 32.6* 32.6* 32.3*   RDW 12.5 12.7 12.5    217 234   MPV 9.8 10.1 9.4       Recent Labs   Lab 12/26/24  0501 12/27/24  0400 12/28/24  0610 12/29/24  0752 12/30/24  0919    138 138 140 140   K 4.0 4.1 4.1 4.4 4.3    107 104 101 103   CO2 27 27 27 31 29   BUN 23.8* 16.1 17.0 16.2 19.8   CREATININE 1.17* 1.16* 1.00 1.27* 1.13*   CALCIUM 8.7 8.3* 8.3* 8.7 9.3   MG 2.10  --   --   --   --    ALBUMIN  --  3.1* 3.2* 3.4  --    ALKPHOS  --  49 52 68  --    ALT  --  9 <5 12  --    AST  --  18 20 41*  --    BILITOT  --  0.4 0.5 0.6  --         Microbiology Results (last 7 days)       ** No results found for the last 168 hours. **             CTA Chest Non-Coronary (PE Studies)  Narrative: EXAMINATION:  CTA CHEST NON CORONARY (PE STUDIES)    CLINICAL HISTORY:  Pulmonary embolism (PE) suspected, positive D-dimer;    TECHNIQUE:  Axial CT images were obtained through the chest after IV  administration of 100 mL Omnipaque 350 contrast.  MIP, coronal and sagittal reconstructions submitted and interpreted. Dose reduction techniques including automatic exposure control (AEC) were utilized.    Total  mGycm    COMPARISON:  Chest radiograph 12/28/2024.    FINDINGS:  No filling defects are in the main pulmonary artery or segmental branches.    No focal consolidation, pneumothorax or pleural effusion.  4 mm solid pulmonary nodule of the left lower lobe on series 12, image 68.  2 mm solid pulmonary nodule of the left upper lobe on series 12, image 42 and left lower lobe on series 12, image 58.    Central airways are clear.    Heart is normal size.  Mild calcific atherosclerosis of the thoracic and partially imaged abdominal aorta.  No evidence of thoracic aortic dissection or aneurysm.    No enlarged lymph nodes.    No acute osseous findings.    No acute abnormality in the visualized upper abdomen.  Small hiatal hernia.  Impression: No CT evidence of acute pulmonary embolism or other acute intrathoracic pathology.    There are a few solid pulmonary nodules noted within the left upper and lower lobes, each measuring 4 mm or less.  If patient is low risk for pulmonary malignancy, no routine follow-up.  If patient is high risk for pulmonary malignancy, optional CT at 12 months; if stable at 12 months, no further follow-up.    Small hiatal hernia.    Mild calcific atherosclerosis of the thoracic and abdominal aorta.    Electronically signed by: Cristian Linton  Date:    12/30/2024  Time:    12:31         Medication List        CHANGE how you take these medications      aspirin 81 MG EC tablet  Commonly known as: ECOTRIN  Take 1 tablet (81 mg total) by mouth 2 (two) times a day.  What changed: when to take this            CONTINUE taking these medications      cetirizine 10 MG tablet  Commonly known as: ZYRTEC  TAKE 1 TABLET BY MOUTH EVERY DAY     EScitalopram oxalate 10 MG tablet  Commonly known as:  LEXAPRO  Take 1 tablet (10 mg total) by mouth once daily.     famotidine 20 MG tablet  Commonly known as: PEPCID  Take 1 tablet (20 mg total) by mouth once daily.     levothyroxine 50 MCG tablet  Commonly known as: SYNTHROID  Take 1 tablet (50 mcg total) by mouth before breakfast.     omeprazole 20 MG capsule  Commonly known as: PRILOSEC  Take 1 capsule (20 mg total) by mouth once daily.     pravastatin 20 MG tablet  Commonly known as: PRAVACHOL  Take 1 tablet (20 mg total) by mouth once daily.     solifenacin 10 MG tablet  Commonly known as: VESICARE  Take 1 tablet (10 mg total) by mouth once daily.     vitamin D 1000 units Tab  Commonly known as: VITAMIN D3               Where to Get Your Medications        Information about where to get these medications is not yet available    Ask your nurse or doctor about these medications  aspirin 81 MG EC tablet          Explained in detail to the patient about the discharge plan, medications, and follow-up visits. Pt understands and agrees with the treatment plan  Discharge Disposition: Home or Self Care   Discharged Condition: stable  Diet-   Dietary Orders (From admission, onward)       Start     Ordered    12/26/24 1051  Diet Adult Regular Standard Tray  Diet effective now        Question:  Tray type:  Answer:  Standard Tray    12/26/24 1052                   Medications Per DC med rec  Activities as tolerated   Follow-up Information       Art Agudelo, DO Follow up in 3 week(s).    Specialty: Orthopedic Surgery  Why: For suture removal, for wound recheck, Follow up with Dr. Agudelo's PA for post op exam  Contact information:  [address]  [address]  [facility] [id number] [phone]               [facility].    Specialties: Home Health Services, Home Therapy Services, Home Living Aide Services  Why: This is your home health provider. You can contact your provider with any questions or concerns.  Contact information:  [address]  [address]  [facility] [address]  [phone]             Rose Trujillo MD Follow up in 1 week(s).    Specialty: Family Medicine  Contact information:  [address]  [address]  [facility] [address]  [phone]               [facility] Follow up in 1 week(s).    Contact information:  for pulm nodules                         For further questions contact hospitalist office    Discharge time 33 minutes    For worsening symptoms, chest pain, shortness of breath, increased abdominal pain, high grade fever, stroke or stroke like symptoms, immediately go to the nearest Emergency Room or call 911 as soon as possible.      Jasmine Beltran M.D, on 12/30/2024. at 2:07 PM.

## 2024-12-30 NOTE — NURSING
Patient discharged home accompanied by family member. Report called to receiving nurse, questions answered. IV removed, VSS, pt in no apparent distress. Belongings in hand, needs met, pt satisfied with care.

## 2025-01-16 ENCOUNTER — OFFICE VISIT (OUTPATIENT)
Dept: ORTHOPEDICS | Facility: CLINIC | Age: 85
End: 2025-01-16
Payer: MEDICARE

## 2025-01-16 ENCOUNTER — HOSPITAL ENCOUNTER (OUTPATIENT)
Dept: RADIOLOGY | Facility: CLINIC | Age: 85
Discharge: HOME | End: 2025-01-16
Attending: PHYSICIAN ASSISTANT
Payer: MEDICARE

## 2025-01-16 VITALS
SYSTOLIC BLOOD PRESSURE: 105 MMHG | HEART RATE: 105 BPM | HEIGHT: 62 IN | DIASTOLIC BLOOD PRESSURE: 62 MMHG | WEIGHT: 127.88 LBS | BODY MASS INDEX: 23.53 KG/M2

## 2025-01-16 DIAGNOSIS — S72.001D CLOSED FRACTURE OF NECK OF RIGHT FEMUR WITH ROUTINE HEALING, SUBSEQUENT ENCOUNTER: Primary | ICD-10-CM

## 2025-01-16 DIAGNOSIS — S72.001D CLOSED FRACTURE OF NECK OF RIGHT FEMUR WITH ROUTINE HEALING, SUBSEQUENT ENCOUNTER: ICD-10-CM

## 2025-01-16 PROCEDURE — 99024 POSTOP FOLLOW-UP VISIT: CPT | Mod: POP,,, | Performed by: PHYSICIAN ASSISTANT

## 2025-01-16 PROCEDURE — 73502 X-RAY EXAM HIP UNI 2-3 VIEWS: CPT | Mod: RT,,, | Performed by: PHYSICIAN ASSISTANT

## 2025-01-16 NOTE — PROGRESS NOTES
Subjective:       Patient ID: Trever Del Toro is a 84 y.o. female.  Chief Complaint   Patient presents with    Right Hip - Post-op Evaluation     3.5 week f/u from perc pinning right hip. Ambulates with walker. Reports increase pain at night.         HPI    Patient presents today and a half week follow up percutaneous pinning of the right hip with FNS system.  She is weight-bearing as tolerated range of motion as tolerated with the use of a walker.  Her son is present with her today.  She is doing well overall.  She is home and has outpatient therapy set up.  She has no restrictions in his doing well overall states pain at night improved with Tylenol.  She is taking minimal pain medications.  Has been showering and wearing incisions keeping clean and dry.  He is healing well today with staples remaining place.  She has no numbness or tingling distally.    ROS:  Constitutional: Denies fever chills  Eyes: No change in vision  ENT: No ringing or current infections  CV: No chest pain  Resp: No labored breathing  MSK: Pain evident at site of injury located in HPI,   Integ: No signs of abrasions or lacerations  Neuro: No numbness or tingling  Lymphatic: No swelling outside the area of injury     Current Outpatient Medications on File Prior to Visit   Medication Sig Dispense Refill    aspirin (ECOTRIN) 81 MG EC tablet Take 1 tablet (81 mg total) by mouth 2 (two) times a day.      cetirizine (ZYRTEC) 10 MG tablet TAKE 1 TABLET BY MOUTH EVERY DAY 30 tablet 0    EScitalopram oxalate (LEXAPRO) 10 MG tablet Take 1 tablet (10 mg total) by mouth once daily. 90 tablet 3    levothyroxine (SYNTHROID) 50 MCG tablet Take 1 tablet (50 mcg total) by mouth before breakfast. 90 tablet 3    omeprazole (PRILOSEC) 20 MG capsule Take 1 capsule (20 mg total) by mouth once daily. 90 capsule 3    pravastatin (PRAVACHOL) 20 MG tablet Take 1 tablet (20 mg total) by mouth once daily. 90 tablet 3    vitamin D (VITAMIN D3) 1000 units Tab Take 1,000  "Units by mouth once daily.      famotidine (PEPCID) 20 MG tablet Take 1 tablet (20 mg total) by mouth once daily. 30 tablet 0    solifenacin (VESICARE) 10 MG tablet Take 1 tablet (10 mg total) by mouth once daily. 30 tablet 11     No current facility-administered medications on file prior to visit.          Objective:      /62   Pulse 105   Ht 5' 2" (1.575 m)   Wt 58 kg (127 lb 13.9 oz)   BMI 23.39 kg/m²   Physical Exam  General the patient is alert and oriented x3 no acute distress nontoxic-appearing appropriate affect.    Constitutional: Vital signs are examined and stable.  Resp: No signs of labored breathing                        RLE: -Skin:  Incisions healing well without erythema, drainage, signs of dehiscence.  Staples removed today without complication.           -MSK: : Hip and Knee F/E, EHL/FHL, Gastroc/Tib anterior Strength 5/5           -Neuro:  Sensation intact to light touch L3-S1 dermatomes           -Lymphatic: No signs of lymphadenopathy           -CV: Capillary refill is less than 2 seconds.           Body mass index is 23.39 kg/m².  Ideal body weight: 50.1 kg (110 lb 7.2 oz)  Adjusted ideal body weight: 53.3 kg (117 lb 6.7 oz)  No results found for: "HGBA1C"  Hgb   Date Value Ref Range Status   12/30/2024 11.0 (L) 12.0 - 16.0 g/dL Final   12/29/2024 10.7 (L) 12.0 - 16.0 g/dL Final     Hct   Date Value Ref Range Status   12/30/2024 34.1 (L) 37.0 - 47.0 % Final   12/29/2024 32.8 (L) 37.0 - 47.0 % Final     Iron Level   Date Value Ref Range Status   06/30/2021 95 50 - 170 ug/dL Final   01/23/2019 61 50 - 175 mcg/dL Final     No components found for: "FROLATE"  Vitamin D   Date Value Ref Range Status   02/09/2023 35.7 30.0 - 80.0 ng/mL Final   07/12/2022 32.1 30.0 - 80.0 ng/mL Final     WBC   Date Value Ref Range Status   12/30/2024 6.40 4.50 - 11.50 x10(3)/mcL Final   12/29/2024 5.79 4.50 - 11.50 x10(3)/mcL Final       Radiology:  Three-view x-ray right hip with pelvis reveal hardware " intact without signs of loosening and failure to the right femoral neck fracture.  Stable alignment as compared to postoperative images.  No consolidation as expected.  No displacement, no osteolysis.        Assessment:         1. Closed fracture of neck of right femur with routine healing, subsequent encounter  X-Ray Hip 2 or 3 views Right with Pelvis when performed              Plan:         No follow-ups on file.    Trever was seen today for post-op evaluation.    Diagnoses and all orders for this visit:    Closed fracture of neck of right femur with routine healing, subsequent encounter  -     X-Ray Hip 2 or 3 views Right with Pelvis when performed; Future        -continue with outpatient physical therapy weightbear as tolerated range of motion as tolerated.  No restrictions.  She may do physical therapy as tolerated.  She states she enjoyed this in the past and did well improving her mobility.  Her son is hopeful that she will return to her normal activities.  Discussed that she may need the walker for lifelong used to prevent further falls  -discussed risks of nonunion as well as avascular necrosis and possible need of a total hip arthroplasty in the future.  She voiced understanding with this.    -we will follow up with her in about 6 weeks for repeat x-rays and evaluation.  -ED precautions given    The above findings, diagnostics, and treatment plan were discussed with Dr. Agudelo who is in agreement with the plan of care except as stated in additional documentation.       Avis Santiago PA-C          Future Appointments   Date Time Provider Department Center   3/3/2025 10:30 AM Art Agudelo DO [facility] [place]   3/18/2025  1:20 PM Rodney Liang FNP [facility]

## 2025-02-14 DIAGNOSIS — E03.9 HYPOTHYROIDISM, UNSPECIFIED TYPE: ICD-10-CM

## 2025-02-14 RX ORDER — LEVOTHYROXINE SODIUM 50 UG/1
50 TABLET ORAL
Qty: 90 TABLET | Refills: 0 | Status: SHIPPED | OUTPATIENT
Start: 2025-02-14

## 2025-02-17 DIAGNOSIS — E03.9 HYPOTHYROIDISM, UNSPECIFIED TYPE: ICD-10-CM

## 2025-02-17 RX ORDER — LEVOTHYROXINE SODIUM 50 UG/1
75 TABLET ORAL
Qty: 90 TABLET | Refills: 0 | Status: SHIPPED | OUTPATIENT
Start: 2025-02-17

## 2025-03-17 ENCOUNTER — HOSPITAL ENCOUNTER (OUTPATIENT)
Dept: RADIOLOGY | Facility: CLINIC | Age: 85
Discharge: HOME | End: 2025-03-17
Attending: PHYSICIAN ASSISTANT
Payer: MEDICARE

## 2025-03-17 ENCOUNTER — OFFICE VISIT (OUTPATIENT)
Dept: ORTHOPEDICS | Facility: CLINIC | Age: 85
End: 2025-03-17
Payer: MEDICARE

## 2025-03-17 VITALS
SYSTOLIC BLOOD PRESSURE: 138 MMHG | BODY MASS INDEX: 23.53 KG/M2 | HEART RATE: 81 BPM | DIASTOLIC BLOOD PRESSURE: 79 MMHG | RESPIRATION RATE: 18 BRPM | WEIGHT: 127.88 LBS | HEIGHT: 62 IN

## 2025-03-17 DIAGNOSIS — S72.001D CLOSED FRACTURE OF NECK OF RIGHT FEMUR WITH ROUTINE HEALING, SUBSEQUENT ENCOUNTER: ICD-10-CM

## 2025-03-17 DIAGNOSIS — S72.001D CLOSED FRACTURE OF NECK OF RIGHT FEMUR WITH ROUTINE HEALING, SUBSEQUENT ENCOUNTER: Primary | ICD-10-CM

## 2025-03-17 PROCEDURE — 99024 POSTOP FOLLOW-UP VISIT: CPT | Mod: POP,,, | Performed by: ORTHOPAEDIC SURGERY

## 2025-03-17 PROCEDURE — 73502 X-RAY EXAM HIP UNI 2-3 VIEWS: CPT | Mod: RT,,, | Performed by: PHYSICIAN ASSISTANT

## 2025-03-18 ENCOUNTER — OFFICE VISIT (OUTPATIENT)
Dept: PRIMARY CARE CLINIC | Facility: CLINIC | Age: 85
End: 2025-03-18
Payer: MEDICARE

## 2025-03-18 VITALS
SYSTOLIC BLOOD PRESSURE: 133 MMHG | WEIGHT: 125.63 LBS | HEIGHT: 62 IN | BODY MASS INDEX: 23.12 KG/M2 | DIASTOLIC BLOOD PRESSURE: 74 MMHG | OXYGEN SATURATION: 92 % | HEART RATE: 81 BPM

## 2025-03-18 DIAGNOSIS — N18.31 STAGE 3A CHRONIC KIDNEY DISEASE: ICD-10-CM

## 2025-03-18 DIAGNOSIS — E03.9 HYPOTHYROIDISM, UNSPECIFIED TYPE: ICD-10-CM

## 2025-03-18 DIAGNOSIS — R35.0 URINARY FREQUENCY: ICD-10-CM

## 2025-03-18 DIAGNOSIS — S72.001D CLOSED FRACTURE OF NECK OF RIGHT FEMUR WITH ROUTINE HEALING, SUBSEQUENT ENCOUNTER: Primary | ICD-10-CM

## 2025-03-18 DIAGNOSIS — R91.1 SOLITARY PULMONARY NODULE: ICD-10-CM

## 2025-03-18 PROCEDURE — 99214 OFFICE O/P EST MOD 30 MIN: CPT | Mod: ,,, | Performed by: NURSE PRACTITIONER

## 2025-03-18 RX ORDER — LEVOTHYROXINE SODIUM 50 UG/1
50 TABLET ORAL
Start: 2025-03-18 | End: 2025-03-19 | Stop reason: SDUPTHER

## 2025-03-18 NOTE — ASSESSMENT & PLAN NOTE
Lab Results   Component Value Date    TSH 1.292 02/09/2023    [id number] 1.03 07/12/2022      Continue Levothyroxine 50mcg daily  Repeat TSH + FT4 ordered  Take medicine on an empty stomach with water (no other medications or beverages). Wait 30 minutes to eat or drink.  Report any symptoms of thinning hair, breaking nails, fatigue, weight gain or loss, palpitations.

## 2025-03-18 NOTE — PROGRESS NOTES
Internal Medicine    Patient ID: [id number]     Chief Complaint: Follow-up (Hospital follow up right hip replacement in december)    HPI:     Trever Del Toro is a 85 y.o. female here today for a hospital follow up. Admitted on 12/24/25 and discharged on 12/40/24. S/p closed reduction +pinning of right femoral neck fracture. She has seen Dr. Agudelo and cleared to full weight bearing activity.     Referred to lung mass clinic for incidental solid JOSHUA/LLL pulmonary nodules x 2 < 4mm found while in hospital. Nonsmoker.  Asymptomatic.     Accompanied by her son today. Inquiring about switching over to an Internal Medicine MD - we discussed several options and she will let us know their decision after discussing later today. No other complaints today.     Past Medical History:   Diagnosis Date    Anticoagulant long-term use     asa 81mg    High cholesterol     Stroke     TIA many years ago // no deficeit    Thyroid disease         Past Surgical History:   Procedure Laterality Date    DILATION AND CURETTAGE OF UTERUS      GALLBLADDER SURGERY      HYSTERECTOMY      PERCUTANEOUS PINNING OF HIP Right 12/26/2024    Procedure: PINNING, HIP, PERCUTANEOUS;  Surgeon: Art Agudelo DO;  Location: [facility];  Service: Orthopedics;  Laterality: Right;  supine hana table synthes FNS c arm    REPAIR OF RECTOCELE      REVISION OF PUBOVAGINAL SLING N/A 05/04/2021    Procedure: REVISION, PUBOVAGINAL SLING;  Surgeon: Evin Miguel MD;  Location: [facility] OR [address] [place] [address];  Service: Urology;  Laterality: N/A;  1hr    THYROID SURGERY Right     TONSILLECTOMY          Social History     Tobacco Use    Smoking status: Never    Smokeless tobacco: Never   Substance and Sexual Activity    Alcohol use: Not Currently    Drug use: Never    Sexual activity: Not Currently        Current Outpatient Medications   Medication Instructions    aspirin (ECOTRIN) 81 mg, Oral, 2 times daily    cetirizine (ZYRTEC) 10 MG tablet TAKE 1 TABLET BY MOUTH EVERY DAY     "EScitalopram oxalate (LEXAPRO) 10 mg, Oral, Daily    famotidine (PEPCID) 20 mg, Oral, Daily    levothyroxine (SYNTHROID) 50 mcg, Oral, Before breakfast    omeprazole (PRILOSEC) 20 mg, Oral, Daily    pravastatin (PRAVACHOL) 20 mg, Oral, Daily    solifenacin (VESICARE) 10 mg, Oral, Daily    vitamin D (VITAMIN D3) 1,000 Units, Daily       Review of patient's allergies indicates:   Allergen Reactions    Cephalexin Other (See Comments)    Amoxicillin-pot clavulanate Nausea And Vomiting    Codeine Nausea And Vomiting        Patient Care Team:  Rose Trujillo MD as PCP - General (Family Medicine)  Gustavo Butler MD     Subjective:     Review of Systems    12 point review of systems conducted, negative except as stated in the history of present illness. See HPI for details.    Objective:     Visit Vitals  /74   Pulse 81   Ht 5' 2" (1.575 m)   Wt 57 kg (125 lb 9.6 oz)   SpO2 (!) 92%   BMI 22.97 kg/m²       Physical Exam  HENT:      Mouth/Throat:      Mouth: Mucous membranes are moist.   Cardiovascular:      Rate and Rhythm: Normal rate and regular rhythm.      Heart sounds: Normal heart sounds. No murmur heard.     No friction rub. No gallop.   Pulmonary:      Effort: Pulmonary effort is normal. No respiratory distress.   Musculoskeletal:         General: Normal range of motion.      Cervical back: Neck supple.      Comments: Ambulating independently    Skin:     General: Skin is warm and dry.   Neurological:      General: No focal deficit present.      Mental Status: She is oriented to person, place, and time.   Psychiatric:         Mood and Affect: Mood normal.         Behavior: Behavior normal.         Thought Content: Thought content normal.         Judgment: Judgment normal.         Labs Reviewed:     Chemistry:  Lab Results   Component Value Date     12/30/2024    K 4.3 12/30/2024    BUN 19.8 12/30/2024    CREATININE 1.13 (H) 12/30/2024    EGFRNORACEVR 48 12/30/2024    GLUCOSE 92 12/30/2024    " CALCIUM 9.3 12/30/2024    ALKPHOS 68 12/29/2024    LABPROT 5.9 12/29/2024    ALBUMIN 3.4 12/29/2024    BILIDIR 0.2 04/15/2022    IBILI 0.50 04/15/2022    AST 41 (H) 12/29/2024    ALT 12 12/29/2024    MG 2.10 12/26/2024    PHOS 4.1 07/16/2018    [id number] 35.7 02/09/2023    TSH 1.292 02/09/2023    [id number] 1.03 07/12/2022        Hematology:  Lab Results   Component Value Date    WBC 6.40 12/30/2024    HGB 11.0 (L) 12/30/2024    HCT 34.1 (L) 12/30/2024     12/30/2024       Lipid Panel:  Lab Results   Component Value Date    CHOL 213 (H) 02/09/2023    HDL 59 02/09/2023    .00 02/09/2023    TRIG 78 02/09/2023    TOTALCHOLEST 4 02/09/2023        Urine:  Lab Results   Component Value Date    APPEARANCEUA Turbid (A) 09/06/2023    SGUA 1.019 09/06/2023    PROTEINUA 2+ (A) 09/06/2023    KETONESUA Negative 09/06/2023    LEUKOCYTESUR 3+ (A) 09/06/2023    RBCUA 21-50 (A) 09/06/2023    WBCUA >100 (A) 09/06/2023    BACTERIA Many (A) 09/06/2023        Assessment:       ICD-10-CM ICD-9-CM   1. Closed fracture of neck of right femur with routine healing, subsequent encounter  S72.001D V54.13   2. Urinary frequency  R35.0 788.41   3. Solitary pulmonary nodule  R91.1 793.11   4. Stage 3a chronic kidney disease  N18.31 585.3   5. Hypothyroidism, unspecified type  E03.9 244.9        Plan:     1. Closed fracture of neck of right femur with routine healing, subsequent encounter  Assessment & Plan:  Healing well.  Followed by Ortho - Dr. Agudelo.  Recommend PT -       2. Urinary frequency  Assessment & Plan:  UA today    Orders:  -     Urinalysis, Reflex to Urine Culture    3. Solitary pulmonary nodule  Assessment & Plan:  Repeat CT in 12/2025  Low risk for malignancy     Orders:  -     CT Chest Without Contrast; Future; Expected date: 12/18/2025    4. Stage 3a chronic kidney disease  Overview:  Lab Results   Component Value Date    EGFRNORACEVR 48 12/30/2024    EGFRNORACEVR 42 12/29/2024     Stable from renal standpoint.  Repeat BMP ordered  Follow renoprotective measures including Renal Diet (reduce intake of nuts, peanut butter, milk, cheese, dried beans, peas) and Low Sodium Diet (less than 2 grams per day).  Avoid NSAIDs (Aleve, Mobic, Celebrex, Ibuprofen, Advil, Toradol and Diclofenac). May take Tylenol as needed for headache/pain.  Control DM with goal A1C <7. BP goal <130/80. LDL goal < 100.  Stay well hydrated. Avoid alcohol and soda. Limit tea and coffee.    Orders:  -     Basic Metabolic Panel; Future; Expected date: 03/18/2025    5. Hypothyroidism, unspecified type  Assessment & Plan:  Lab Results   Component Value Date    TSH 1.292 02/09/2023    [id number] 1.03 07/12/2022      Continue Levothyroxine 50mcg daily  Repeat TSH + FT4 ordered  Take medicine on an empty stomach with water (no other medications or beverages). Wait 30 minutes to eat or drink.  Report any symptoms of thinning hair, breaking nails, fatigue, weight gain or loss, palpitations.     Orders:  -     TSH; Future; Expected date: 03/18/2025  -     T4, Free; Future; Expected date: 03/18/2025  -     levothyroxine (SYNTHROID) 50 MCG tablet; Take 1 tablet (50 mcg total) by mouth before breakfast.        In addition to their scheduled follow up, the patient has also been instructed to follow up on as needed basis.     Future Appointments   Date Time Provider Department Center   6/19/2025  8:45 AM Art Agudelo DO [facility]

## 2025-03-19 DIAGNOSIS — K21.9 GASTROESOPHAGEAL REFLUX DISEASE, UNSPECIFIED WHETHER ESOPHAGITIS PRESENT: ICD-10-CM

## 2025-03-19 DIAGNOSIS — E03.9 HYPOTHYROIDISM, UNSPECIFIED TYPE: ICD-10-CM

## 2025-03-19 DIAGNOSIS — E78.5 HYPERLIPIDEMIA, UNSPECIFIED: ICD-10-CM

## 2025-03-19 RX ORDER — PRAVASTATIN SODIUM 20 MG/1
20 TABLET ORAL DAILY
Qty: 90 TABLET | Refills: 0 | Status: SHIPPED | OUTPATIENT
Start: 2025-03-19

## 2025-03-19 RX ORDER — OMEPRAZOLE 20 MG/1
20 CAPSULE, DELAYED RELEASE ORAL DAILY
Qty: 90 CAPSULE | Refills: 0 | Status: SHIPPED | OUTPATIENT
Start: 2025-03-19

## 2025-03-19 RX ORDER — LEVOTHYROXINE SODIUM 50 UG/1
50 TABLET ORAL
Qty: 90 TABLET | Refills: 0
Start: 2025-03-19

## 2025-03-19 NOTE — PROGRESS NOTES
"Subjective:       Patient ID: Trever Del Toro is a 85 y.o. female.  Chief Complaint   Patient presents with    Right Hip - Follow-up     11.5 WEEK F/U PERC PINNING RIGHT HIP.  AMBULATING WITH WALKER, NO COMPLAINTS OF HIP PAIN.       HPI:  History of Present Illness    HPI:  Ms. Del Toro presents for follow-up of a hip fracture. She reports her hip is feeling fine with decreased pain compared to previously. She experiences back pain and occasional discomfort when sleeping on her right side. Her neck was previously very painful following a fall, but that pain has resolved. She has difficulty getting out of a chair at times and currently uses a walker for mobility assistance.    She denies current significant hip pain.    PREVIOUS TREATMENTS:  Ms. Del Toro underwent hip fracture surgery with internal fixation using screws. She reports reduced pain and improved mobility, indicating that the surgery appears to have been successful.    IMAGING:  X-rays of the hip were performed. The images reveal a well-aligned hip joint with internal fixation hardware in place. The femoral head appears intact without signs of collapse. There is minimal compression of 3-4 mm, which is considered normal for the type of fixation used. It's noted that the viability of the femoral head cannot be definitively determined for up to a year post-surgery.      ROS:  Musculoskeletal: +back pain, denies limb pain, +nightime pain, +difficulty standing up          ROS:  Constitutional: Denies fever chills  Eyes: No change in vision  ENT: No ringing or current infections  CV: No chest pain  Resp: No labored breathing  MSK: Pain evident at site of injury located in HPI,   Integ: No signs of abrasions or lacerations  Neuro: No numbness or tingling  Lymphatic: No swelling outside the area of injury     Medications Ordered Prior to Encounter[1]       Objective:      /79   Pulse 81   Resp 18   Ht 5' 2" (1.575 m)   Wt 58 kg (127 lb 13.9 oz)   BMI 23.39 " "kg/m²   General the patient is alert and oriented x3 no acute distress nontoxic-appearing appropriate affect.    Constitutional: Vital signs are examined and stable.  Resp: No signs of labored breathing                        RLE: -Skin: tolerated log roll, ambulates in room. No signs of new abrasions or lacerations, no scars           -MSK: : Hip and Knee F/E, EHL/FHL, Gastroc/Tib anterior Strength 5/5           -Neuro:  Sensation intact to light touch L3-S1 dermatomes           -Lymphatic: No signs of lymphadenopathy           -CV: Capillary refill is less than 2 seconds. DP/PT pulses  2/4. Compartments soft and compressible.   Body mass index is 23.39 kg/m².  Ideal body weight: 50.1 kg (110 lb 7.2 oz)  Adjusted ideal body weight: 53.3 kg (117 lb 6.7 oz)  No results found for: "HGBA1C"  Hgb   Date Value Ref Range Status   12/30/2024 11.0 (L) 12.0 - 16.0 g/dL Final   12/29/2024 10.7 (L) 12.0 - 16.0 g/dL Final     Vitamin D   Date Value Ref Range Status   02/09/2023 35.7 30.0 - 80.0 ng/mL Final   07/12/2022 32.1 30.0 - 80.0 ng/mL Final     WBC   Date Value Ref Range Status   12/30/2024 6.40 4.50 - 11.50 x10(3)/mcL Final   12/29/2024 5.79 4.50 - 11.50 x10(3)/mcL Final       Radiology: Two views Right hip showing intact hardware without signs of AVN        Assessment:         1. Closed fracture of neck of right femur with routine healing, subsequent encounter  X-Ray Hip 2 or 3 views Right with Pelvis when performed              Plan:         No follow-ups on file.    Trever was seen today for follow-up.    Diagnoses and all orders for this visit:    Closed fracture of neck of right femur with routine healing, subsequent encounter  -     X-Ray Hip 2 or 3 views Right with Pelvis when performed; Future      Assessment & Plan    PLAN SUMMARY:  - Try 4-point cane as alternative to walker  - Use walker in public spaces  - Discussed potential future need for total hip replacement  - Follow-up in 3-4 months or sooner if " needed    FOLLOW UP:  - Follow up in 3-4 months or sooner if needed.    PROCEDURES:  - Discussed potential future need for total hip replacement if femoral head necrosis occurs.    PATIENT INSTRUCTIONS:  - Use walker in public spaces.  - Try 4-point cane as alternative to walker.          Pt doing well. No signs of AVN at this time.      This note/OR report was created with the assistance of  voice recognition software or phone  dictation.  There may be transcription errors as a result of using this technology however minimal. Effort has been made to assure accuracy of transcription but any obvious errors or omissions should be clarified with the author of the document.     This note was generated with the assistance of ambient listening technology. Verbal consent was obtained by the patient and accompanying visitor(s) for the recording of patient appointment to facilitate this note. I attest to having reviewed and edited the generated note for accuracy, though some syntax or spelling errors may persist. Please contact the author of this note for any clarification.       Art Agudelo DO  Orthopedic Trauma Surgery  03/19/2025      Future Appointments   Date Time Provider Department Center   6/19/2025  8:45 AM Art Agudelo DO [facility]                   [1]   Current Outpatient Medications on File Prior to Visit   Medication Sig Dispense Refill    cetirizine (ZYRTEC) 10 MG tablet TAKE 1 TABLET BY MOUTH EVERY DAY 30 tablet 0    EScitalopram oxalate (LEXAPRO) 10 MG tablet Take 1 tablet (10 mg total) by mouth once daily. 90 tablet 3    omeprazole (PRILOSEC) 20 MG capsule Take 1 capsule (20 mg total) by mouth once daily. 90 capsule 3    pravastatin (PRAVACHOL) 20 MG tablet Take 1 tablet (20 mg total) by mouth once daily. 90 tablet 3    vitamin D (VITAMIN D3) 1000 units Tab Take 1,000 Units by mouth once daily.      aspirin (ECOTRIN) 81 MG EC tablet Take 1 tablet (81 mg total) by mouth 2 (two) times a day.       famotidine (PEPCID) 20 MG tablet Take 1 tablet (20 mg total) by mouth once daily. 30 tablet 0    solifenacin (VESICARE) 10 MG tablet Take 1 tablet (10 mg total) by mouth once daily. 30 tablet 11     No current facility-administered medications on file prior to visit.

## 2025-03-20 ENCOUNTER — TELEPHONE (OUTPATIENT)
Dept: PRIMARY CARE CLINIC | Facility: CLINIC | Age: 85
End: 2025-03-20
Payer: MEDICARE

## 2025-03-20 NOTE — TELEPHONE ENCOUNTER
Refill sentCopied from CRM #[id number]. Topic: Medications - Pharmacy  >> Mar 13, 2025  1:11 PM Latisha wrote:  Who Called: elías in regards to Marli Del Toro    Pharmacy is calling to request assistance with Rx    Pharmacy name and phone number: [id number] - [place], [place] - [address]   Phone: [phone]  Fax: [phone]        Pharmacy contact: elías  Patient Name: marli  Prescription Name: omeprazole (PRILOSEC) 20 MG capsule 90 capsule   pravastatin (PRAVACHOL) 20 MG tablet 90 tablet  What do they need to clarify?:refill        Preferred Method of Contact: Phone Call  Patient's Preferred Phone Number on File: [phone]   Best Call Back Number, if different:  Additional Information: pharmacy call, please advise, thanks

## 2025-03-21 LAB
BACTERIA #/AREA URNS AUTO: ABNORMAL /HPF
BILIRUB UR QL STRIP.AUTO: NEGATIVE
CLARITY UR: CLEAR
COLOR UR AUTO: ABNORMAL
GLUCOSE UR QL STRIP: NORMAL
HGB UR QL STRIP: NEGATIVE
KETONES UR QL STRIP: NEGATIVE
LEUKOCYTE ESTERASE UR QL STRIP: NEGATIVE
MUCOUS THREADS URNS QL MICRO: ABNORMAL /LPF
NITRITE UR QL STRIP: NEGATIVE
PH UR STRIP: 5.5 [PH]
PROT UR QL STRIP: NEGATIVE
RBC #/AREA URNS AUTO: ABNORMAL /HPF
SP GR UR STRIP.AUTO: 1.01 (ref 1–1.03)
SQUAMOUS #/AREA URNS LPF: ABNORMAL /HPF
UROBILINOGEN UR STRIP-ACNC: NORMAL
WBC #/AREA URNS AUTO: ABNORMAL /HPF

## 2025-03-26 ENCOUNTER — RESULTS FOLLOW-UP (OUTPATIENT)
Dept: INTERNAL MEDICINE | Facility: CLINIC | Age: 85
End: 2025-03-26

## 2025-03-26 NOTE — PROGRESS NOTES
Please inform patient of results.    1. Kidney function stable.    2. Thyroid normal - continue current dose    3. UA normal    All other labwork within acceptable limits.    Thanks for all you do,    Rodney

## 2025-03-27 ENCOUNTER — TELEPHONE (OUTPATIENT)
Dept: PRIMARY CARE CLINIC | Facility: CLINIC | Age: 85
End: 2025-03-27
Payer: MEDICARE

## 2025-03-27 DIAGNOSIS — E03.9 HYPOTHYROIDISM, UNSPECIFIED TYPE: ICD-10-CM

## 2025-03-27 RX ORDER — LEVOTHYROXINE SODIUM 50 UG/1
50 TABLET ORAL
Qty: 90 TABLET | Refills: 0 | Status: SHIPPED | OUTPATIENT
Start: 2025-03-27

## 2025-03-27 NOTE — TELEPHONE ENCOUNTER
----- Message from Silas sent at 3/27/2025 11:39 AM CDT -----  .Who Called: Trever Del ToroRefill or New Rx:RefillRX Name and Strength:levothyroxine (SYNTHROID) 50 MCG tabletHow is the patient currently taking it? (ex. 1XDay):1xIs this a 30 day or 90 day RX:90Local or Mail Order:mailList of preferred pharmacies on file (remove unneeded): [unfilled]If different Pharmacy is requested, enter Pharmacy information here including location and phone number: [facility] Ordering Provider:donna Preferred Method of Contact: Phone CallPatient's Preferred Phone Number on File: [phone] Best Call Back Number, if different:Additional Information:

## 2025-03-28 ENCOUNTER — PATIENT MESSAGE (OUTPATIENT)
Dept: INTERNAL MEDICINE | Facility: CLINIC | Age: 85
End: 2025-03-28
Payer: MEDICARE

## 2025-06-19 ENCOUNTER — HOSPITAL ENCOUNTER (OUTPATIENT)
Dept: RADIOLOGY | Facility: CLINIC | Age: 85
Discharge: HOME | End: 2025-06-19
Attending: ORTHOPAEDIC SURGERY
Payer: MEDICARE

## 2025-06-19 ENCOUNTER — OFFICE VISIT (OUTPATIENT)
Dept: ORTHOPEDICS | Facility: CLINIC | Age: 85
End: 2025-06-19
Payer: MEDICARE

## 2025-06-19 VITALS
SYSTOLIC BLOOD PRESSURE: 150 MMHG | DIASTOLIC BLOOD PRESSURE: 79 MMHG | WEIGHT: 125.69 LBS | HEART RATE: 76 BPM | HEIGHT: 62 IN | BODY MASS INDEX: 23.13 KG/M2

## 2025-06-19 DIAGNOSIS — S72.001D CLOSED FRACTURE OF NECK OF RIGHT FEMUR WITH ROUTINE HEALING, SUBSEQUENT ENCOUNTER: ICD-10-CM

## 2025-06-19 DIAGNOSIS — S72.001D CLOSED FRACTURE OF NECK OF RIGHT FEMUR WITH ROUTINE HEALING, SUBSEQUENT ENCOUNTER: Primary | ICD-10-CM

## 2025-06-19 PROCEDURE — 73502 X-RAY EXAM HIP UNI 2-3 VIEWS: CPT | Mod: RT,,, | Performed by: ORTHOPAEDIC SURGERY

## 2025-06-19 PROCEDURE — 99213 OFFICE O/P EST LOW 20 MIN: CPT | Mod: ,,, | Performed by: ORTHOPAEDIC SURGERY

## 2025-06-19 NOTE — PROGRESS NOTES
"Subjective:       Patient ID: Trever Del Toro is a 85 y.o. female.  Chief Complaint   Patient presents with    Right Hip - Follow-up     6 month sp RT Hip Perc Pinning 12/26/24-3/26/25, patient states the hip is doing fine, only concerns is that she has to use a wide stand to lean to pick something up on the floor        HPI:  History of Present Illness    HPI:  Ms. Del Toro presents for follow-up six months after a hip fracture. She reports overall improvement but notes specific limitations. She experiences discomfort when bending over to pull weeds, requiring her to position her hip in a certain way. When bending in this manner, she experiences significant pain. She also has difficulty picking up objects from the floor while seated, experiencing discomfort on the affected side. She denies using assistive devices such as a cane or walker and reports issues with forward balance. She has previously undergone PT but is no longer participating.    She denies any formal medical diagnoses.    PREVIOUS TREATMENTS:  Ms. Del Toro previously underwent physical therapy but is no longer participating in the treatment.      ROS:  Musculoskeletal: +pain with movement  Neurological: +balance issues          ROS:  Constitutional: Denies fever chills  Eyes: No change in vision  ENT: No ringing or current infections  CV: No chest pain  Resp: No labored breathing  MSK: Pain evident at site of injury located in HPI,   Integ: No signs of abrasions or lacerations  Neuro: No numbness or tingling  Lymphatic: No swelling outside the area of injury     Medications Ordered Prior to Encounter[1]       Objective:      BP (!) 150/79 (BP Location: Left arm, Patient Position: Sitting)   Pulse (P) 75   Ht 5' 2" (1.575 m)   Wt 57 kg (125 lb 10.6 oz)   BMI 22.98 kg/m²   General the patient is alert and oriented x3 no acute distress nontoxic-appearing appropriate affect.    Constitutional: Vital signs are examined and stable.  Resp: No signs of labored " "breathing                        RLE: -Skin:tolerates all movementof the hip           -MSK: : Hip and Knee F/E, EHL/FHL, Gastroc/Tib anterior Strength 5/5           -Neuro:  Sensation intact to light touch L3-S1 dermatomes           -Lymphatic: No signs of lymphadenopathy           -CV: Capillary refill is less than 2 seconds. DP/PT pulses  2/4. Compartments soft and compressible.   Body mass index is 22.98 kg/m².  Ideal body weight: 50.1 kg (110 lb 7.2 oz)  Adjusted ideal body weight: 52.9 kg (116 lb 8.6 oz)  No results found for: "HGBA1C"  Hgb   Date Value Ref Range Status   12/30/2024 11.0 (L) 12.0 - 16.0 g/dL Final   12/29/2024 10.7 (L) 12.0 - 16.0 g/dL Final     Vitamin D   Date Value Ref Range Status   02/09/2023 35.7 30.0 - 80.0 ng/mL Final   07/12/2022 32.1 30.0 - 80.0 ng/mL Final     WBC   Date Value Ref Range Status   12/30/2024 6.40 4.50 - 11.50 x10(3)/mcL Final   12/29/2024 5.79 4.50 - 11.50 x10(3)/mcL Final       Radiology: Two views Right hip showing intact hardware without signs of AVN        Assessment:         1. Closed fracture of neck of right femur with routine healing, subsequent encounter  X-Ray Hip 2 or 3 views Right with Pelvis when performed              Plan:         No follow-ups on file.    Trever was seen today for follow-up.    Diagnoses and all orders for this visit:    Closed fracture of neck of right femur with routine healing, subsequent encounter  -     X-Ray Hip 2 or 3 views Right with Pelvis when performed; Future      Assessment & Plan               Pt doing well. No signs of AVN at this time. Can work out in her yard. Ambulating without a walker      This note/OR report was created with the assistance of  voice recognition software or phone  dictation.  There may be transcription errors as a result of using this technology however minimal. Effort has been made to assure accuracy of transcription but any obvious errors or omissions should be clarified with the author of the " document.     This note was generated with the assistance of ambient listening technology. Verbal consent was obtained by the patient and accompanying visitor(s) for the recording of patient appointment to facilitate this note. I attest to having reviewed and edited the generated note for accuracy, though some syntax or spelling errors may persist. Please contact the author of this note for any clarification.       Art Agudelo DO  Orthopedic Trauma Surgery  06/19/2025      No future appointments.                    [1]   Current Outpatient Medications on File Prior to Visit   Medication Sig Dispense Refill    aspirin (ECOTRIN) 81 MG EC tablet Take 1 tablet (81 mg total) by mouth 2 (two) times a day. (Patient taking differently: Take 81 mg by mouth once daily.)      EScitalopram oxalate (LEXAPRO) 10 MG tablet Take 1 tablet (10 mg total) by mouth once daily. 90 tablet 3    famotidine (PEPCID) 20 MG tablet Take 1 tablet (20 mg total) by mouth once daily. 30 tablet 0    levothyroxine (SYNTHROID) 50 MCG tablet Take 1 tablet (50 mcg total) by mouth before breakfast. 90 tablet 0    omeprazole (PRILOSEC) 20 MG capsule Take 1 capsule (20 mg total) by mouth once daily. 90 capsule 0    pravastatin (PRAVACHOL) 20 MG tablet Take 1 tablet (20 mg total) by mouth once daily. 90 tablet 0    vitamin D (VITAMIN D3) 1000 units Tab Take 1,000 Units by mouth once daily.      cetirizine (ZYRTEC) 10 MG tablet TAKE 1 TABLET BY MOUTH EVERY DAY (Patient not taking: Reported on 6/19/2025) 30 tablet 0    solifenacin (VESICARE) 10 MG tablet Take 1 tablet (10 mg total) by mouth once daily. 30 tablet 11     No current facility-administered medications on file prior to visit.

## 2025-07-02 DIAGNOSIS — F41.9 ANXIETY: ICD-10-CM

## 2025-07-02 DIAGNOSIS — E03.9 HYPOTHYROIDISM, UNSPECIFIED TYPE: ICD-10-CM

## 2025-07-02 DIAGNOSIS — E78.5 HYPERLIPIDEMIA, UNSPECIFIED: ICD-10-CM

## 2025-07-02 RX ORDER — ESCITALOPRAM OXALATE 10 MG/1
10 TABLET ORAL DAILY
Qty: 30 TABLET | Refills: 3 | Status: SHIPPED | OUTPATIENT
Start: 2025-07-02

## 2025-07-02 RX ORDER — LEVOTHYROXINE SODIUM 50 UG/1
50 TABLET ORAL
Qty: 90 TABLET | Refills: 3 | Status: SHIPPED | OUTPATIENT
Start: 2025-07-02

## 2025-07-02 RX ORDER — PRAVASTATIN SODIUM 20 MG/1
20 TABLET ORAL DAILY
Qty: 90 TABLET | Refills: 3 | Status: SHIPPED | OUTPATIENT
Start: 2025-07-02

## 2025-08-13 ENCOUNTER — TELEPHONE (OUTPATIENT)
Dept: PRIMARY CARE CLINIC | Facility: CLINIC | Age: 85
End: 2025-08-13
Payer: MEDICARE

## (undated) DEVICE — COVER FULLGUARD SHOE HIGH-TOP

## (undated) DEVICE — KIT OTRHOPEDIC FRACTURE

## (undated) DEVICE — DRAPE STERI U-SHAPED 47X51IN

## (undated) DEVICE — GLOVE PROTEXIS BLUE LATEX 9

## (undated) DEVICE — TRAY MINOR GEN SURG

## (undated) DEVICE — COVER TABLE HVY DTY 60X90IN

## (undated) DEVICE — SEE MEDLINE ITEM 152622

## (undated) DEVICE — TRAY FOLEY 16FR INFECTION CONT

## (undated) DEVICE — NDL HYPO REG 25G X 1 1/2

## (undated) DEVICE — ELECTRODE REM POLYHESIVE II

## (undated) DEVICE — SUT 2-0 VICRYL / SH (J417)

## (undated) DEVICE — DRESSING GAUZE XEROFORM 5X9

## (undated) DEVICE — SUT MONOCRYL 3-0 PS-2 UND

## (undated) DEVICE — Device

## (undated) DEVICE — PACK LAPSCP/PELVSCPY III TIBRN

## (undated) DEVICE — HOOK STAY ELAS 5MM 8EA/PK

## (undated) DEVICE — BIT DRILL 413X4.3MM

## (undated) DEVICE — SOL NS 1000CC

## (undated) DEVICE — DRAPE FULL SHEET 70X100IN

## (undated) DEVICE — ELECTRODE PATIENT RETURN DISP

## (undated) DEVICE — WIRE GUIDE 3.2MM 400MM
Type: IMPLANTABLE DEVICE | Site: FEMUR | Status: NON-FUNCTIONAL
Removed: 2024-12-26

## (undated) DEVICE — SUT VICRYL BR 1 GEN 27 CT-1

## (undated) DEVICE — ELECTRODE REM PLYHSV RETURN 9

## (undated) DEVICE — SUT MCRYL PLUS 2-0 CT-1 36IN

## (undated) DEVICE — BANDAGE ADHESIVE

## (undated) DEVICE — STAPLER SKIN PROXIMATE WIDE

## (undated) DEVICE — DRAPE IOBAN 2 STERI

## (undated) DEVICE — SPONGE DERMACEA GAUZE 4X4

## (undated) DEVICE — SCREW ANTIROTATION 85MM
Type: IMPLANTABLE DEVICE | Site: FEMUR | Status: NON-FUNCTIONAL
Removed: 2024-12-26

## (undated) DEVICE — GOWN SMARTGOWN 3XL XLONG

## (undated) DEVICE — LUBRICANT SURGILUBE 2 OZ

## (undated) DEVICE — GLOVE SIGNATURE MICRO LTX 6

## (undated) DEVICE — DRESSING WND GZ 10X4X8X2IN

## (undated) DEVICE — DRESSING XEROFORM NONADH 1X8IN

## (undated) DEVICE — SET IRR URLGY 2LINE UNIV SPIKE

## (undated) DEVICE — MARCAINE EPI INJ SDV .25% 10ML

## (undated) DEVICE — SEE MEDLINE ITEM 157181

## (undated) DEVICE — TAPE SILK 3IN

## (undated) DEVICE — SOL NACL IRR 1000ML BTL

## (undated) DEVICE — KIT SURGICAL TURNOVER

## (undated) DEVICE — GLOVE PROTEXIS NEU-THERA SZ6

## (undated) DEVICE — GLOVE PROTEXIS HYDROGEL SZ9

## (undated) DEVICE — DRAPE STERI INSTRUMENT 1018

## (undated) DEVICE — SUT MONOCRYL 3-0 RB1

## (undated) DEVICE — CLIPPER BLADE MOD 4406 (CAREF)

## (undated) DEVICE — SUT 2-0 VICRYL / CT-1

## (undated) DEVICE — RETRACTOR STERILE PLASTIC G2

## (undated) DEVICE — APPLICATOR CHLORAPREP ORN 26ML